# Patient Record
Sex: FEMALE | Race: WHITE | NOT HISPANIC OR LATINO | URBAN - METROPOLITAN AREA
[De-identification: names, ages, dates, MRNs, and addresses within clinical notes are randomized per-mention and may not be internally consistent; named-entity substitution may affect disease eponyms.]

---

## 2017-02-21 ENCOUNTER — OFFICE VISIT (OUTPATIENT)
Dept: PSYCHIATRY | Facility: CLINIC | Age: 52
End: 2017-02-21
Attending: PSYCHIATRY & NEUROLOGY
Payer: MEDICARE

## 2017-02-21 VITALS — WEIGHT: 119.4 LBS | DIASTOLIC BLOOD PRESSURE: 74 MMHG | SYSTOLIC BLOOD PRESSURE: 141 MMHG | HEART RATE: 72 BPM

## 2017-02-21 DIAGNOSIS — F90.0 ATTENTION DEFICIT HYPERACTIVITY DISORDER (ADHD), PREDOMINANTLY INATTENTIVE TYPE: ICD-10-CM

## 2017-02-21 DIAGNOSIS — F41.1 GAD (GENERALIZED ANXIETY DISORDER): Primary | ICD-10-CM

## 2017-02-21 PROCEDURE — 99212 OFFICE O/P EST SF 10 MIN: CPT | Mod: ZF

## 2017-02-21 RX ORDER — TRAZODONE HYDROCHLORIDE 100 MG/1
TABLET ORAL
Qty: 90 TABLET | Refills: 3 | Status: SHIPPED | OUTPATIENT
Start: 2017-02-21 | End: 2017-07-11

## 2017-02-21 RX ORDER — METHYLPHENIDATE HYDROCHLORIDE 54 MG/1
54 TABLET ORAL EVERY MORNING
Qty: 30 TABLET | Refills: 0 | Status: SHIPPED | OUTPATIENT
Start: 2017-02-21 | End: 2017-03-22

## 2017-02-21 RX ORDER — QUETIAPINE FUMARATE 25 MG/1
25 TABLET, FILM COATED ORAL
Qty: 30 TABLET | Refills: 1 | Status: SHIPPED | OUTPATIENT
Start: 2017-02-21 | End: 2017-10-10

## 2017-02-21 RX ORDER — GABAPENTIN 100 MG/1
100 CAPSULE ORAL 3 TIMES DAILY PRN
Qty: 90 CAPSULE | Refills: 1 | Status: SHIPPED | OUTPATIENT
Start: 2017-02-21 | End: 2017-10-10

## 2017-02-21 NOTE — PROGRESS NOTES
"Progress Notes   Amy Ricks MD (Physician)     Psychiatry         IDENTIFYING DATA:  The patient is a 51-year-old  white female, self-referred, who presented 2 months ago to me for return to psychiatric care.  She was a previous patient of mine several years ago and  was last followed in .  At that time she was not able to make it to appointments because of transportation and she was referred back to her primary care physician for followup.      Fatigue and depression have been the most prominent problems as has chronic fibromyalgia pain.         HISTORY OF PRESENT ILLNESS: The patient did not make it to her last appt due to 's illness. She also went to a  and realized she had been away from people and \"inviiible\" for 4 yrs. She tthinks Seroquel and Lamictal helped.  Sleep is pretty good; she is down to 2 Trazodone.  Restless legs not bad and she takes only 1 clonazepam.  Pain keeps her awake.  She doesn't feel anything. It is hard to get motivated.  She just started getting walking and this feels good. She completed 10 weeks PT and has more to go but cancelled appt due to Don's condition. She is decondishioned . She feels exhausted and then feels guilty about it and not being able to play more with the dog.      Concentration is not good but has clear moments where she can retain info.    Panic attacks - when watching news. No unanticipated ones.     Eating always good.  She eats out of boredom.  This scares her because of her h/o ED. She chews sugary gum because it is comforting. No alcohol use.  She has dental problems both related to eating disorder and gum chewing and dry mouth from meds.       .  Her current prescribing physician is Dr. Lucas in [place].  Another concern of hers is her cognitive functioning.  She describes losing her words and having attention problems.         She has been maintained on the following psychiatric medications :    1.  Cymbalta 120 mg daily "  -unclear if this is beneficial  2.  Concerta 54 mg daily  - this is beneficial  3.  Nortriptyline 50 mg q hs for sleep. - definitely helps sleep     4.  BuSpar 40 mg q a.m. and 30 mg q p.m.  -finds this helpful  5.  Klonopin 0.5 mg q hs for sleep.  with the other meds, sleeps ok.      PAST PSYCHIATRIC HISTORY:  History of depression and anxiety are longstanding.  She has been on several medications.   Zoloft which helped.   Paxil also helped.  She was on Effexor which was okay but gave her a dry mouth.  There was some problem from Lexapro.  She is unsure if she was on Celexa.  Probably she was on Prozac.  Trazodone gave her a dry mouth.  She was never on Wellbutrin largely because of her history of seizures 10 years ago.  She was on Lyrica more for pain but it caused swelling and shaking involuntary movements.  Gabapentin caused neck stiffness.  Her recollection is that seroquel and lamictal were helpful for mood.       MEDICAL HISTORY:  The patient has had fibromyalgia for many years and has severely limited her activities and effected her mood.     Her neck always has tension and she has ongoing problems with migraines. She was on oxycodone 20 mg for many years and it did help with pain, anxiety and reflux, but has been off for a few months. . Ite was discontinued because of hyperalgesia.  She is now doing physical therapy.  She has noted significant limitations in her daily function because of being off of opiates.        OTHER MEDICATIONS:  Estradiol, Compazine and Maxalt        FAMILY HISTORY:  Notable for Alzheimer disease in mother.  Father with alcoholism.  He was found dead in his apartment over 5 years ago.        SOCIAL HISTORY:  The patient has been  for 17 years to Jamal who is now retired. He was recently hospitalized and noted to have EF of 40%.   He is on disability.  She enjoys reading.  Her 's dog, an aerdale has been instrumental in getting her out of the house to walk.      REVIEW OF  SYSTEMS:   headaches, pain and fatigue.  Remainder of ten-point review of systems negative except as noted above.        MENTAL STATUS EXAMINATION:  The patient is very slender, appears her stated age. Oriented x3.  Mood is anxious and depressed , affect consistent.   Speech is regular rate and rhythm. Language intact.  She does have some word finding difficulty, mild-mod.  Thought processes are logical and goal directed..  Thought content is negative for suicidality and psychotic symptoms.  Thought associations are clear.  .  Fund of knowledge is good.  Insight and judgment are good.        ASSESSMENT:  This is a 51-year-old  white female has  major depression, BEE, panic disorder, ADHD and fibromyalgia, chronic pain a result.  She is in a very difficult relationship.          DIAGNOSES:    1. Major depressive disorder, recurrent, mod     2. Generalized anxiety disorder.     3. Panic disorder.    4. Attention deficit disorder.    5. Bulimia nervosa in full remission.    6. Fibromyalgia.    7. GERD.        PLAN:  Discussed various options for depression, anxiety management.  A consideration is changing anti-depressant but she believes that Seroquel was helpful for her mood so we agreed to try it.  She will take 25 mg QHS and if she is too tired with the combination, she should decrease nortriptyline to 25 mg QHS.   A prescription was written for Concerta 54 mg. Also, she has a supply of gabapentin 100 mg and I suggested she could take one tid prn anxiety.  She will return again in 1 month.     I spent a total of 45 minutes face-to-face with Rossi Tavarez during today's office visit.  Over 50% of this time was spent counseling the patient and/or coordinating care regarding the complicated regimen and choices of medications.  See note for details.        CHANEL GREEN MD

## 2017-02-21 NOTE — MR AVS SNAPSHOT
After Visit Summary   2017    Rossi Tavarez    MRN: [id number]           Patient Information     Date Of Birth          1965        Visit Information        Provider Department      2017 4:00 PM Amy Ricks MD Psychiatry Clinic        Today's Diagnoses     BEE (generalized anxiety disorder)    -  1    Attention deficit hyperactivity disorder (ADHD), predominantly inattentive type           Follow-ups after your visit        Follow-up notes from your care team     Return in about 4 weeks (around 3/21/2017).      Your next 10 appointments already scheduled     Mar 21, 2017  2:30 PM CDT   Adult Med Follow UP with Amy Ricks MD   Psychiatry Clinic ([facility])    [address] [facility] [address] [phone]              Who to contact     Please call your clinic at [phone] to:    Ask questions about your health    Make or cancel appointments    Discuss your medicines    Learn about your test results    Speak to your doctor   If you have compliments or concerns about an experience at your clinic, or if you wish to file a complaint, please contact [facility] Patient Relations at [phone] or email us at [email or web address]         Additional Information About Your Visit        MyChart Information     [employer] is an electronic gateway that provides easy, online access to your medical records. With [employer], you can request a clinic appointment, read your test results, renew a prescription or communicate with your care team.     To sign up for [employer] visit the website at [email or web address]   You will be asked to enter the access code listed below, as well as some personal information. Please follow the directions to create your username and password.     Your access code is: [email or web address]  Expires: 2017  5:32 PM     Your access code will  in 90 days. If  you need help or a new code, please contact your [facility] or call [phone] for assistance.        Care EveryWhere ID     This is your Care EveryWhere ID. This could be used by other organizations to access your [facility] medical records  [id number]        Your Vitals Were     Pulse                   72            Blood Pressure from Last 3 Encounters:   02/21/17 141/74   12/27/16 130/80   11/22/16 123/79    Weight from Last 3 Encounters:   02/21/17 54.2 kg (119 lb 6.4 oz)   12/27/16 52.8 kg (116 lb 6.4 oz)   11/22/16 53.1 kg (117 lb)              Today, you had the following     No orders found for display         Today's Medication Changes          These changes are accurate as of: 2/21/17  5:32 PM.  If you have any questions, ask your nurse or doctor.               Start taking these medicines.        Dose/Directions    gabapentin 100 MG capsule   Commonly known as:  NEURONTIN   Used for:  BEE (generalized anxiety disorder)   Started by:  Amy Ricks MD        Dose:  100 mg   Take 1 capsule (100 mg) by mouth 3 times daily as needed   Quantity:  90 capsule   Refills:  1       QUEtiapine 25 MG tablet   Commonly known as:  SEROQUEL   Used for:  BEE (generalized anxiety disorder)   Started by:  Amy Ricks MD        Dose:  25 mg   Take 1 tablet (25 mg) by mouth nightly as needed   Quantity:  30 tablet   Refills:  1       traZODone 100 MG tablet   Commonly known as:  DESYREL   Used for:  BEE (generalized anxiety disorder)   Started by:  Amy Ricks MD        2-3 tablets QHS prn sleep   Quantity:  90 tablet   Refills:  3            Where to get your medicines      These medications were sent to [id number] - [place], [place] - [address]  [address] [facility] [address]     Phone:  [phone]     gabapentin 100 MG capsule    QUEtiapine 25 MG tablet    traZODone 100 MG tablet         Some of these will need a paper prescription and others can  be bought over the counter.  Ask your nurse if you have questions.     Bring a paper prescription for each of these medications     methylphenidate ER 54 MG CR tablet                Primary Care Provider Office Phone # Fax #    Fei Lucas [phone]       [facility] [address]  [facility] [address]        Thank you!     Thank you for choosing PSYCHIATRY CLINIC  for your care. Our goal is always to provide you with excellent care. Hearing back from our patients is one way we can continue to improve our services. Please take a few minutes to complete the written survey that you may receive in the mail after your visit with us. Thank you!             Your Updated Medication List - Protect others around you: Learn how to safely use, store and throw away your medicines at www.disposemymeds.org.          This list is accurate as of: 2/21/17  5:32 PM.  Always use your most recent med list.                   Brand Name Dispense Instructions for use    clonazePAM 0.5 MG tablet    klonoPIN    90 tablet    Take 3 tablets (1.5 mg) by mouth nightly as needed for anxiety       gabapentin 100 MG capsule    NEURONTIN    90 capsule    Take 1 capsule (100 mg) by mouth 3 times daily as needed       methylphenidate ER 54 MG CR tablet    CONCERTA    30 tablet    Take 1 tablet (54 mg) by mouth every morning       QUEtiapine 25 MG tablet    SEROQUEL    30 tablet    Take 1 tablet (25 mg) by mouth nightly as needed       traZODone 100 MG tablet    DESYREL    90 tablet    2-3 tablets QHS prn sleep

## 2017-03-22 DIAGNOSIS — F90.0 ATTENTION DEFICIT HYPERACTIVITY DISORDER (ADHD), PREDOMINANTLY INATTENTIVE TYPE: ICD-10-CM

## 2017-03-22 RX ORDER — METHYLPHENIDATE HYDROCHLORIDE 54 MG/1
54 TABLET ORAL EVERY MORNING
Qty: 30 TABLET | Refills: 0 | Status: SHIPPED | OUTPATIENT
Start: 2017-03-22 | End: 2017-07-11

## 2017-03-22 RX ORDER — METHYLPHENIDATE HYDROCHLORIDE 54 MG/1
54 TABLET ORAL EVERY MORNING
Qty: 30 TABLET | Refills: 0 | Status: SHIPPED | OUTPATIENT
Start: 2017-04-22 | End: 2017-04-18

## 2017-03-22 NOTE — TELEPHONE ENCOUNTER
Refill Request  Received: Yesterday       Jennifer Duuqe, Zee JUAREZ RN       Phone Number: [phone]                     Caller:  Patient   Medication:  [facility]   Pharmacy and location:  [facility]   Have you contacted the pharmacy: no   How much of medication do you have left:  Out in a week   Pending appt: 5/2/17   Okay to leave VM:  yes     Patient was scheduled to see Dr. Ricks today, but had to reschedule to May 2.  She normally picks up her scripts when she is here, so was not sure if it is better to mail the script to her or send to the pharmacy.  She would like a call back to discuss her options.

## 2017-03-28 NOTE — TELEPHONE ENCOUNTER
Scripts signed by Dr. Ricks    Called and lvm for pt requesting c/b to verify how she would like to receive scripts (p/u, mail to home, or mail to pharmacy)    Clinic number provided for c/b.

## 2017-04-10 NOTE — TELEPHONE ENCOUNTER
From: Rosa Anton  Sent: 4/7/2017   2:04 PM  To: Zee Romero RN  Subject: Med Refill    Contact: [phone]                                     Юлия/abhijit    Caller:  Pt  Medication:  Concerta  Pharmacy and location:  [facility] [place]  Have you contacted the pharmacy: Yesterday   How much of medication do you have left: None  Pending appt: 5/2  Okay to leave VM: Yes    Pt said she was told med would be faxed to pharmacy but they have not received it.

## 2017-04-10 NOTE — TELEPHONE ENCOUNTER
-Returned call to pt  -Male answers the phone  -Pt unavailable as he believes she is outside  -He is aware of reason for call  -Discuss that scripts were still in clinic  -He requested that scripts mailed to home and he will relay to pt  -Verified address  -Scripts mailed to pt's home address:    [address] [facility] [address]

## 2017-04-18 ENCOUNTER — TELEPHONE (OUTPATIENT)
Dept: PSYCHIATRY | Facility: CLINIC | Age: 52
End: 2017-04-18

## 2017-04-18 DIAGNOSIS — F90.0 ATTENTION DEFICIT HYPERACTIVITY DISORDER (ADHD), PREDOMINANTLY INATTENTIVE TYPE: ICD-10-CM

## 2017-04-18 RX ORDER — METHYLPHENIDATE HYDROCHLORIDE 54 MG/1
54 TABLET ORAL EVERY MORNING
Qty: 30 TABLET | Refills: 0 | Status: SHIPPED | OUTPATIENT
Start: 2017-04-22 | End: 2017-06-09

## 2017-04-18 NOTE — TELEPHONE ENCOUNTER
-Returned call to pt  -Her  opened the envelope with scripts in them with a letter cutter  -This accidentally cut off top of prescriptions  -Pharmacy would not refill them even when they brought in cut off part of script  -Pt requesting replacement script be mailed directly to pharmacy  -Next appt is 5/2/17  -Script printed and placed in provider folder

## 2017-04-18 NOTE — TELEPHONE ENCOUNTER
Meds/Specker  Received: Today       Jessica Rodriguez, Zee JUAREZ, RN       Phone Number: [phone] (Call me)                     The pt is caller. States she received the Concerta scripts in the mail, but because of the way her  cut the envelopes open the pharmacy won't fill the script. She's wondering if this can go out today as she's been out of the meds for a couple of weeks now.  She'd like it mailed to the pharmacy this time.     Pharmacy: [facility] [address] [facility] in [place]

## 2017-04-19 NOTE — TELEPHONE ENCOUNTER
-Script signed by Dr. Ricks    -Mailed to:    [facility]   Attn: Pharmacist   [address] [place] [address]    -Pt notified via phone

## 2017-04-24 ENCOUNTER — TELEPHONE (OUTPATIENT)
Dept: PSYCHIATRY | Facility: CLINIC | Age: 52
End: 2017-04-24

## 2017-04-24 NOTE — TELEPHONE ENCOUNTER
Prior Authorization Retail Medication Request  Medication/Dose: Methylphenidate 54 mg  Diagnosis and ICD code:   New/Renewal/Insurance Change PA:   Previously Tried and Failed Therapies:     Insurance ID (if provided): [id number]  Insurance Phone (if provided): [phone]    Any additional info from fax request:     If you received a fax notification from an outside Pharmacy:  Pharmacy Name:[facility]  Pharmacy #:[phone]  Pharmacy Fax:[phone]

## 2017-04-25 NOTE — TELEPHONE ENCOUNTER
-PA approved  -Approval dates: 03/13/2017-04/24/2018  -PA reference number: [id number]  -Pharmacy and patient notified   -Approval letter sent to scanning, copy   kept in psychiatry until scanning completed/   confirmed. Lauren Abraham LPN

## 2017-05-31 ENCOUNTER — TELEPHONE (OUTPATIENT)
Dept: PSYCHIATRY | Facility: CLINIC | Age: 52
End: 2017-05-31

## 2017-05-31 DIAGNOSIS — F41.1 GAD (GENERALIZED ANXIETY DISORDER): ICD-10-CM

## 2017-05-31 DIAGNOSIS — F90.0 ATTENTION DEFICIT HYPERACTIVITY DISORDER (ADHD), PREDOMINANTLY INATTENTIVE TYPE: ICD-10-CM

## 2017-05-31 NOTE — TELEPHONE ENCOUNTER
----- Message from Radha Mello RN sent at 5/31/2017 11:39 AM CDT -----  Regarding: FW: Meds/Millicent  Contact: [phone]      ----- Message -----     From: Jessica Rodriguez     Sent: 5/31/2017  10:59 AM       To: Zee Romero RN  Subject: Jarviss/Millicent                                     The pt is caller.  She needs refills on Ritalin and Clonazepam and would like the scripts mailed to her pharmacy.  States she has 5 pills left of the Ritalin and is getting low on the Clonazepam.  She has an appt on 8/8 and is on the cancellation list as well.  Ok to [facility].    Pharmacy: [facility]

## 2017-06-09 RX ORDER — CLONAZEPAM 0.5 MG/1
1.5 TABLET ORAL
Qty: 90 TABLET | Refills: 0
Start: 2017-06-09 | End: 2017-07-11

## 2017-06-09 RX ORDER — METHYLPHENIDATE HYDROCHLORIDE 54 MG/1
54 TABLET ORAL EVERY MORNING
Qty: 30 TABLET | Refills: 0 | Status: SHIPPED | OUTPATIENT
Start: 2017-06-09 | End: 2017-07-11

## 2017-06-09 NOTE — TELEPHONE ENCOUNTER
Called in the prescription to the pharmacist, [place], at [facility] pharmacy.

## 2017-06-09 NOTE — TELEPHONE ENCOUNTER
Reordered Klonopin and Concerta.  Will call in Klonopin and printed out a paper copy of Concerta for signature.    Called and left generic VM for patient asking for a return call.    Will route to provider for FYI.

## 2017-06-09 NOTE — TELEPHONE ENCOUNTER
Aym Ricks MD Valena, Victoria, RN        Caller: Unspecified (1 week ago)                     Yes this is fine.

## 2017-06-13 NOTE — TELEPHONE ENCOUNTER
Called patient to find out how she would like the Concerta prescription handled.  She asked that it be mailed to [facility] in [place].

## 2017-07-11 ENCOUNTER — OFFICE VISIT (OUTPATIENT)
Dept: PSYCHIATRY | Facility: CLINIC | Age: 52
End: 2017-07-11
Attending: PSYCHIATRY & NEUROLOGY
Payer: MEDICARE

## 2017-07-11 DIAGNOSIS — F41.1 GAD (GENERALIZED ANXIETY DISORDER): ICD-10-CM

## 2017-07-11 DIAGNOSIS — F90.0 ATTENTION DEFICIT HYPERACTIVITY DISORDER (ADHD), PREDOMINANTLY INATTENTIVE TYPE: ICD-10-CM

## 2017-07-11 RX ORDER — TRAZODONE HYDROCHLORIDE 100 MG/1
TABLET ORAL
Qty: 90 TABLET | Refills: 3 | Status: SHIPPED | OUTPATIENT
Start: 2017-07-11 | End: 2017-10-10

## 2017-07-11 RX ORDER — METHYLPHENIDATE HYDROCHLORIDE 54 MG/1
54 TABLET ORAL EVERY MORNING
Qty: 30 TABLET | Refills: 0 | Status: SHIPPED | OUTPATIENT
Start: 2017-07-11 | End: 2017-11-14

## 2017-07-11 RX ORDER — CLONAZEPAM 0.5 MG/1
1.5 TABLET ORAL
Qty: 90 TABLET | Refills: 1 | Status: SHIPPED | OUTPATIENT
Start: 2017-07-11 | End: 2017-11-14

## 2017-07-11 RX ORDER — METHYLPHENIDATE HYDROCHLORIDE 54 MG/1
54 TABLET ORAL EVERY MORNING
Qty: 30 TABLET | Refills: 0 | Status: SHIPPED | OUTPATIENT
Start: 2017-07-11 | End: 2017-10-10

## 2017-07-11 RX ORDER — DULOXETIN HYDROCHLORIDE 60 MG/1
120 CAPSULE, DELAYED RELEASE ORAL DAILY
Qty: 60 CAPSULE | Refills: 3 | Status: SHIPPED | OUTPATIENT
Start: 2017-07-11 | End: 2017-10-10

## 2017-07-11 NOTE — PROGRESS NOTES
Progress Notes  Encounter Date: 7-  Amy Ricks MD   Psychiatry      []Hide copied text  Progress Notes   Amy Ricks MD (Physician)     Psychiatry           IDENTIFYING DATA:  The patient is a 51-year-old  white female, self-referred, who presented 2 months ago to me for return to psychiatric care.  She was a previous patient of mine several years ago and  was last followed in 2011.  At that time she was not able to make it to appointments because of transportation and she was referred back to her primary care physician for followup.      Fatigue and depression have been the most prominent problems as has chronic fibromyalgia pain.       INTERIM HX:: Pain is still the same;  Muscle spasms, burning unchanged.Lyrica did nothing.   She is very frustrated with this.  Fatigue is a major problem.  Once she is nerve pain, she developed rapid heart rate and this could last days.      She ran out of [employer] and she was off of it x 3 months.  There was a delay in getting her rx.  Then she didn't have the money to pick it up.  Very poor concentration off of it.  She is back on it now for last few weeks.  It helps the fatigue and concentration.  She is doing everything she can for her wellbeing. Depression is bad when her pain and fatigue are worse, she gets anxious, heart pounds. Interested in some areas: news.  Motivation is up and down depending on level of pain and fatigue.  She recognizes that she has to move with her fibromyalgia.  Lyrica caused swelling.    She sleeps if she takes Klonopin-1 and Trazodone 200 mg.    She is not having panic attacks but she gets really anxious either at home or out, especially out. Don makes her anxious; he is very controlling.       Concentration is not good      Eating always good until she is in pain and then she has to force herself.  She focuses on vegetables and fruits. No alcohol use.  She has dental problems both related to eating disorder and gum  chewing and dry mouth from meds.       Her current prescribing physician is Dr. Lucas in [place].  Another concern of hers is her cognitive functioning.  She describes losing her words and having attention problems.  These continue.    She has been maintained on the following psychiatric medications :    1.  Cymbalta 120 mg daily  -it has likely helped  2.  Concerta 54 mg daily  - this is beneficial  3.  Nortriptyline 50 mg q hs for sleep. - definitely helps sleep     4.  BuSpar 40 mg q a.m. and 30 mg q p.m.  -finds this helpful  5.  Klonopin 0.5 mg- 1.5 mg q hs for sleep.  with the other meds, sleeps ok. If restless legs, takes 3.  6. Estradiol  7. Levothyroxine  8. Seroquel 25 mgQHS prn.   9. Maxalt  10. Compazine- nausea  11. gabapentin 100 tid prn       PAST PSYCHIATRIC HISTORY:  REVIEWED PREVIOUSLY. SUMMARIZED HERE History of depression and anxiety are longstanding.  She has been on several medications.   Zoloft which helped.   Paxil also helped.  She was on Effexor which was okay but gave her a dry mouth.  There was some problem from Lexapro.  She is unsure if she was on Celexa.  Probably she was on Prozac.  Trazodone gave her a dry mouth.  She was never on Wellbutrin largely because of her history of seizures 10 years ago.  She was on Lyrica more for pain but it caused swelling and shaking involuntary movements.  Gabapentin caused neck stiffness.  Her recollection is that seroquel and lamictal were helpful for mood.       MEDICAL HISTORY:  SUMMARIZED The patient has had fibromyalgia for many years and has severely limited her activities and effected her mood.     Her neck always has tension and she has ongoing problems with migraines. She was on oxycodone 20 mg for many years and it did help with pain, anxiety and reflux, but has been off for a few months. . Ite was discontinued because of hyperalgesia.  She is now doing physical therapy.  She has noted significant limitations in her daily function  because of being off of opiates.        SOCIAL HISTORY:  The patient has been  for 17 years to Jamal who is now retired. He is around the house all the time and is very critical of her and controlling of all finances.  She has to be sneaky to spend any money. A very positive note is that her daughter who is pregnant at 8 mo has moved back to the cities.  They are very close. She enjoys reading.  Her 's dog, an aerdale has been instrumental in getting her out of the house to walk.      REVIEW OF SYSTEMS:   headaches, pain and fatigue.  Remainder of ten-point review of systems negative except as noted above.        MENTAL STATUS EXAMINATION:  The patient is very slender, appears her stated age. Oriented x3.  Mood is anxious and depressed , affect consistent.   Speech is regular rate and rhythm. Language intact.  She does have some word finding difficulty, mild-mod.  Thought processes are logical and goal directed..  Thought content is negative for suicidality and psychotic symptoms.  Thought associations are clear.  .  Fund of knowledge is good.  Insight and judgment are good.        ASSESSMENT:  This is a 51-year-old  white female has  major depression, BEE, panic disorder, ADHD and fibromyalgia, chronic pain a result.  She is in a very difficult relationship.           DIAGNOSES:    1. Major depressive disorder, recurrent, mod     2. Generalized anxiety disorder.     3. Panic disorder.    4. Attention deficit disorder.    5. Bulimia nervosa in full remission.    6. Fibromyalgia.    7. GERD.        PLAN:  Discussed various options for depression, anxiety management.    Discussed anxiety management.     Seroquel 12.5-25 mg bid-tid prn .   Continue Nortriptyline, gabapentin . Concerta         Last month, there was a consideration of changing anti-depressant but she believed that Seroquel was helpful for her mood so we agreed to try it.  if she is too tired with the combination, she should decrease  nortriptyline to 25 mg QHS.   A prescription was written for Concerta 54 mg. Also, she has a supply of gabapentin 100 mg and I suggested she could take one tid prn anxiety.  She will return again in 1 month.      I spent a total of 30 minutes face-to-face with Rossi Tavarez during today's office visit.  Over 50% of this time was spent counseling the patient and/or coordinating care regarding the complicated regimen and choices of medications.  See note for details.        CHANEL GREEN MD

## 2017-07-11 NOTE — MR AVS SNAPSHOT
After Visit Summary   2017    Rossi Tavarez    MRN: [id number]           Patient Information     Date Of Birth          1965        Visit Information        Provider Department      2017 11:00 AM Amy Ricks MD Psychiatry Clinic        Today's Diagnoses     BEE (generalized anxiety disorder)        Attention deficit hyperactivity disorder (ADHD), predominantly inattentive type           Follow-ups after your visit        Follow-up notes from your care team     Return in about 2 months (around 2017).      Who to contact     Please call your clinic at [phone] to:    Ask questions about your health    Make or cancel appointments    Discuss your medicines    Learn about your test results    Speak to your doctor   If you have compliments or concerns about an experience at your clinic, or if you wish to file a complaint, please contact [facility] Patient Relations at [phone] or email us at [email or web address]         Additional Information About Your Visit        MyChart Information     WiTricityt is an electronic gateway that provides easy, online access to your medical records. With Transmension, you can request a clinic appointment, read your test results, renew a prescription or communicate with your care team.     To sign up for WiTricityt visit the website at [email or web address]   You will be asked to enter the access code listed below, as well as some personal information. Please follow the directions to create your username and password.     Your access code is: [email or web address]  Expires: 10/25/2017 12:10 PM     Your access code will  in 90 days. If you need help or a new code, please contact your [facility] or call [phone] for assistance.        Care EveryWhere ID     This is your Care EveryWhere ID. This could be used by other organizations to access your [facility]  records  [id number]         Blood Pressure from Last 3 Encounters:   02/21/17 141/74   12/27/16 130/80   11/22/16 123/79    Weight from Last 3 Encounters:   02/21/17 54.2 kg (119 lb 6.4 oz)   12/27/16 52.8 kg (116 lb 6.4 oz)   11/22/16 53.1 kg (117 lb)              Today, you had the following     No orders found for display         Today's Medication Changes          These changes are accurate as of: 7/11/17 11:59 PM.  If you have any questions, ask your nurse or doctor.               Start taking these medicines.        Dose/Directions    DULoxetine 60 MG EC capsule   Commonly known as:  CYMBALTA   Used for:  BEE (generalized anxiety disorder)        Dose:  120 mg   Take 2 capsules (120 mg) by mouth daily   Quantity:  60 capsule   Refills:  3            Where to get your medicines      These medications were sent to [id number] - [place], [place] - [address] [facility]  [address] [facility] [address] [address]     Phone:  [phone]     DULoxetine 60 MG EC capsule    traZODone 100 MG tablet         Some of these will need a paper prescription and others can be bought over the counter.  Ask your nurse if you have questions.     Bring a paper prescription for each of these medications     clonazePAM 0.5 MG tablet    methylphenidate ER 54 MG CR tablet    methylphenidate ER 54 MG CR tablet                Primary Care Provider Office Phone # Fax #    Fei Lucas [phone]       [facility] [address]  [facility] [address]        Equal Access to Services     JOSUÉ SADLER AH: Hadii aad ku hadasho Soomaali, waaxda luqadaha, qaybta kaalmada adeegyada, pat menjivar. So [facility] [phone].    ATENCIÓN: Si habla español, tiene a pastor disposición servicios gratuitos de asistencia lingüística. Llame al [phone].    We comply with applicable federal civil rights laws and [place] laws. We do not discriminate on the basis of race, color, national  origin, age, disability sex, sexual orientation or gender identity.            Thank you!     Thank you for choosing PSYCHIATRY CLINIC  for your care. Our goal is always to provide you with excellent care. Hearing back from our patients is one way we can continue to improve our services. Please take a few minutes to complete the written survey that you may receive in the mail after your visit with us. Thank you!             Your Updated Medication List - Protect others around you: Learn how to safely use, store and throw away your medicines at www.disposemymeds.org.          This list is accurate as of: 7/11/17 11:59 PM.  Always use your most recent med list.                   Brand Name Dispense Instructions for use Diagnosis    clonazePAM 0.5 MG tablet    klonoPIN    90 tablet    Take 3 tablets (1.5 mg) by mouth nightly as needed for anxiety    BEE (generalized anxiety disorder)       DULoxetine 60 MG EC capsule    CYMBALTA    60 capsule    Take 2 capsules (120 mg) by mouth daily    BEE (generalized anxiety disorder)       gabapentin 100 MG capsule    NEURONTIN    90 capsule    Take 1 capsule (100 mg) by mouth 3 times daily as needed    BEE (generalized anxiety disorder)       * methylphenidate ER 54 MG CR tablet    CONCERTA    30 tablet    Take 1 tablet (54 mg) by mouth every morning    Attention deficit hyperactivity disorder (ADHD), predominantly inattentive type       * methylphenidate ER 54 MG CR tablet    CONCERTA    30 tablet    Take 1 tablet (54 mg) by mouth every morning    Attention deficit hyperactivity disorder (ADHD), predominantly inattentive type       QUEtiapine 25 MG tablet    SEROQUEL    30 tablet    Take 1 tablet (25 mg) by mouth nightly as needed    BEE (generalized anxiety disorder)       traZODone 100 MG tablet    DESYREL    90 tablet    2-3 tablets QHS prn sleep    BEE (generalized anxiety disorder)       * Notice:  This list has 2 medication(s) that are the same as other medications  prescribed for you. Read the directions carefully, and ask your doctor or other care provider to review them with you.

## 2017-10-10 ENCOUNTER — OFFICE VISIT (OUTPATIENT)
Dept: PSYCHIATRY | Facility: CLINIC | Age: 52
End: 2017-10-10
Attending: PSYCHIATRY & NEUROLOGY
Payer: MEDICARE

## 2017-10-10 DIAGNOSIS — F90.0 ATTENTION DEFICIT HYPERACTIVITY DISORDER (ADHD), PREDOMINANTLY INATTENTIVE TYPE: ICD-10-CM

## 2017-10-10 DIAGNOSIS — F41.1 GAD (GENERALIZED ANXIETY DISORDER): ICD-10-CM

## 2017-10-10 RX ORDER — TRAZODONE HYDROCHLORIDE 100 MG/1
TABLET ORAL
Qty: 90 TABLET | Refills: 3 | Status: SHIPPED | OUTPATIENT
Start: 2017-10-10 | End: 2018-03-13

## 2017-10-10 RX ORDER — GABAPENTIN 300 MG/1
300 CAPSULE ORAL 3 TIMES DAILY PRN
Qty: 90 CAPSULE | Refills: 3 | Status: SHIPPED | OUTPATIENT
Start: 2017-10-10 | End: 2017-11-14

## 2017-10-10 RX ORDER — DULOXETIN HYDROCHLORIDE 60 MG/1
120 CAPSULE, DELAYED RELEASE ORAL DAILY
Qty: 60 CAPSULE | Refills: 3 | Status: SHIPPED | OUTPATIENT
Start: 2017-10-10 | End: 2018-03-13

## 2017-10-10 RX ORDER — METHYLPHENIDATE HYDROCHLORIDE 54 MG/1
54 TABLET ORAL EVERY MORNING
Qty: 30 TABLET | Refills: 0 | Status: SHIPPED | OUTPATIENT
Start: 2017-10-10 | End: 2017-11-14

## 2017-10-10 RX ORDER — QUETIAPINE FUMARATE 25 MG/1
TABLET, FILM COATED ORAL
Qty: 150 TABLET | Refills: 1 | Status: SHIPPED | OUTPATIENT
Start: 2017-10-10 | End: 2017-11-02

## 2017-10-10 NOTE — MR AVS SNAPSHOT
After Visit Summary   10/10/2017    Rossi Tavarez    MRN: [id number]           Patient Information     Date Of Birth          1965        Visit Information        Provider Department      10/10/2017 4:00 PM Amy Ricks MD Psychiatry Clinic        Today's Diagnoses     BEE (generalized anxiety disorder)        Attention deficit hyperactivity disorder (ADHD), predominantly inattentive type           Follow-ups after your visit        Follow-up notes from your care team     Return in about 1 month (around 11/10/2017).      Your next 10 appointments already scheduled     2017  4:30 PM CST   Adult Med Follow UP with Amy Ricks MD   Psychiatry Clinic ([facility])    [address]  [address] [facility] [address] [phone]              Who to contact     Please call your clinic at [phone] to:    Ask questions about your health    Make or cancel appointments    Discuss your medicines    Learn about your test results    Speak to your doctor   If you have compliments or concerns about an experience at your clinic, or if you wish to file a complaint, please contact [facility] Patient Relations at [phone] or email us at [email or web address]         Additional Information About Your Visit        MyChart Information     [email or web address] is an electronic gateway that provides easy, online access to your medical records. With Shopsy, you can request a clinic appointment, read your test results, renew a prescription or communicate with your care team.     To sign up for [email or web address] visit the website at [email or web address]   You will be asked to enter the access code listed below, as well as some personal information. Please follow the directions to create your username and password.     Your access code is: [email or web address]  Expires: 10/25/2017 12:10 PM     Your access code will  in 90 days. If  you need help or a new code, please contact your [facility] or call [phone] for assistance.        Care EveryWhere ID     This is your Care EveryWhere ID. This could be used by other organizations to access your [facility] medical records  [id number]         Blood Pressure from Last 3 Encounters:   02/21/17 141/74   12/27/16 130/80   11/22/16 123/79    Weight from Last 3 Encounters:   02/21/17 54.2 kg (119 lb 6.4 oz)   12/27/16 52.8 kg (116 lb 6.4 oz)   11/22/16 53.1 kg (117 lb)              Today, you had the following     No orders found for display         Today's Medication Changes          These changes are accurate as of: 10/10/17  5:14 PM.  If you have any questions, ask your nurse or doctor.               These medicines have changed or have updated prescriptions.        Dose/Directions    gabapentin 300 MG capsule   Commonly known as:  NEURONTIN   This may have changed:    - medication strength  - how much to take   Used for:  BEE (generalized anxiety disorder)        Dose:  300 mg   Take 1 capsule (300 mg) by mouth 3 times daily as needed   Quantity:  90 capsule   Refills:  3       QUEtiapine 25 MG tablet   Commonly known as:  SEROQUEL   This may have changed:    - how much to take  - how to take this  - when to take this  - reasons to take this  - additional instructions   Used for:  BEE (generalized anxiety disorder)        One tid and 2 at HS prn   Quantity:  150 tablet   Refills:  1            Where to get your medicines      These medications were sent to [id number] [place], [place] - [address]  [address] [facility] [address]     Phone:  [phone]     DULoxetine 60 MG EC capsule    gabapentin 300 MG capsule    QUEtiapine 25 MG tablet    traZODone 100 MG tablet         Some of these will need a paper prescription and others can be bought over the counter.  Ask your nurse if you have questions.     Bring a paper prescription for each of  these medications     methylphenidate ER 54 MG CR tablet                Primary Care Provider Office Phone # Fax #    Fei Lucas [phone]       [facility] [address]  [facility] [address]        Equal Access to Services     JOSUÉ SADLER : Hadvenessa amita corbett jerio Sogabriellaali, waaxda luqadaha, qaybta kaalmada adeegyada, pat mayon adrienne paredes laAnaberanbe menjivar. So [facility] [phone].    ATENCIÓN: Si habla español, tiene a pastor disposición servicios gratuitos de asistencia lingüística. Llame al [phone].    We comply with applicable federal civil rights laws and [place] laws. We do not discriminate on the basis of race, color, national origin, age, disability, sex, sexual orientation, or gender identity.            Thank you!     Thank you for choosing PSYCHIATRY CLINIC  for your care. Our goal is always to provide you with excellent care. Hearing back from our patients is one way we can continue to improve our services. Please take a few minutes to complete the written survey that you may receive in the mail after your visit with us. Thank you!             Your Updated Medication List - Protect others around you: Learn how to safely use, store and throw away your medicines at www.disposemymeds.org.          This list is accurate as of: 10/10/17  5:14 PM.  Always use your most recent med list.                   Brand Name Dispense Instructions for use Diagnosis    clonazePAM 0.5 MG tablet    klonoPIN    90 tablet    Take 3 tablets (1.5 mg) by mouth nightly as needed for anxiety    BEE (generalized anxiety disorder)       DULoxetine 60 MG EC capsule    CYMBALTA    60 capsule    Take 2 capsules (120 mg) by mouth daily    BEE (generalized anxiety disorder)       gabapentin 300 MG capsule    NEURONTIN    90 capsule    Take 1 capsule (300 mg) by mouth 3 times daily as needed    BEE (generalized anxiety disorder)       * methylphenidate ER 54 MG CR tablet    CONCERTA    30 tablet     Take 1 tablet (54 mg) by mouth every morning    Attention deficit hyperactivity disorder (ADHD), predominantly inattentive type       * methylphenidate ER 54 MG CR tablet    CONCERTA    30 tablet    Take 1 tablet (54 mg) by mouth every morning    Attention deficit hyperactivity disorder (ADHD), predominantly inattentive type       QUEtiapine 25 MG tablet    SEROQUEL    150 tablet    One tid and 2 at HS prn    BEE (generalized anxiety disorder)       traZODone 100 MG tablet    DESYREL    90 tablet    2-3 tablets QHS prn sleep    BEE (generalized anxiety disorder)       * Notice:  This list has 2 medication(s) that are the same as other medications prescribed for you. Read the directions carefully, and ask your doctor or other care provider to review them with you.

## 2017-10-10 NOTE — PROGRESS NOTES
"Progress Notes  Psychiatry      []Hide copied text  Progress Notes   Amy Ricks MD (Physician)     Psychiatry            IDENTIFYING DATA:  The patient is a 51-year-old  white female, self-referred, who has returned to me for  psychiatric care.  She was a previous patient of mine several years ago and  was last followed in 2011.  At that time she was not able to make it to appointments because of transportation and she was referred back to her primary care physician for followup.      Fatigue and depression have been the most prominent problems as has chronic fibromyalgia pain.       INTERIM HX::The patient reports a major episode of anxiety and darkness which lasted 2 hours. She took one clonazepam and it resolved. Depression lightened with Seroquel but she has been out of it for over a month (unclear what the problem was). She feels really gloomy. She feels a dread, a darkness.  Energy is low. She really enjoys her granddaughter.  Sometimes she has trouble sleeping, depending on how much pain. Concentration is helped by Concerta.  She has had problems with refills and was out of Concerta and Seroquel for a month.  She does not have any Seroquel currently.    The pain is worse ; her knees buckle; the ligaments and tendons around the knee are causing problems and she received injection in hip because her MD thought it was hip. Pain is multifocal: fibromyalgia, back. She also describes a \"fibro fog.\"     When she develops the intense anxiety heart races. The rapid heart rate also occurs when she is exhausted.      She sleeps if she takes Klonopin-1 and Trazodone 200 mg.       Her current prescribing physician is Dr. Lucas in [place].  Another ongoing concern of hers is her cognitive functioning.  She describes losing her words and having attention problems.      She has been maintained on the following psychiatric medications :    1.  Cymbalta 120 mg daily  -it has likely helped  2.  Concerta 54 mg " daily  - this is beneficial- she was off for a month due to difficulty getting it. It helps with focus, mind racing. She is back on it.   3.  Nortriptyline 50 mg q hs for sleep. - definitely helps sleep     4.  BuSpar 40 mg q a.m. and 30 mg q p.m.  -finds this helpful  5.  Klonopin 0.5 mg- 1.5 mg q hs for sleep.  with the other meds, sleeps ok. If restless legs, takes 3.  Usually 1/night and occasional for anxiety.  6. Estradiol  7. Levothyroxine  8. Seroquel 25 mgQHS prn.- it was starting to help  Mood and then she ran out and couldn't get it refilled..    9. Maxalt- migraines 2-3/week and then a few weeks without any  10. Compazine- nausea  11. gabapentin 100 tid prn       PAST PSYCHIATRIC HISTORY:  REVIEWED PREVIOUSLY. SUMMARIZED HERE History of depression and anxiety are longstanding.  She has been on several medications.   Zoloft which helped.   Paxil also helped.  She was on Effexor which was okay but gave her a dry mouth.  There was some problem from Lexapro.  She is unsure if she was on Celexa.  Probably she was on Prozac.  Trazodone gave her a dry mouth.  She was never on Wellbutrin largely because of her history of seizures 10 years ago.  She was on Lyrica more for pain but it caused swelling and shaking involuntary movements.  Gabapentin caused neck stiffness.  Her recollection is that seroquel and lamictal were helpful for mood.       MEDICAL HISTORY:  SUMMARIZED The patient has had fibromyalgia for many years and has severely limited her activities and effected her mood.     Her neck always has tension and she has ongoing problems with migraines. She was on oxycodone 20 mg for many years and it did help with pain, anxiety and reflux, but has been off for a few months. . Ite was discontinued because of hyperalgesia.  She is now doing physical therapy.  She has noted significant limitations in her daily function because of being off of opiates.        SOCIAL HISTORY:  The patient has been  for 17  years to Jamal who is now retired.He recently was hospitalized for CHF.  She has remained in this relationship because of finances and obligation. A very positive note is that her daughter had her baby and January is now 2 mo old.  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  This is the high point in her life and she is in a good mood when she is there with the baby.  She reports that she has never been closer to her daughter Alecia than now.       REVIEW OF SYSTEMS:   headaches, pain and fatigue.  Remainder of ten-point review of systems negative except as noted above.        MENTAL STATUS EXAMINATION:  The patient is very slender, appears her stated age. Oriented x3.  Mood is anxious and depressed , affect consistent.   Speech is regular rate and rhythm. Language intact.  She does have some word finding difficulty, mild-mod.  Thought processes are logical and goal directed..  Thought content is negative for suicidality and psychotic symptoms.  Thought associations are clear.  .  Fund of knowledge is good.  Insight and judgment are good.        ASSESSMENT:  This is a 51-year-old  white female has  major depression, BEE, panic disorder, ADHD and fibromyalgia, chronic pain a result.  She is in a very difficult relationship.           DIAGNOSES:    1. Major depressive disorder, recurrent, mod     2. Generalized anxiety disorder.     3. Panic disorder.    4. Attention deficit disorder.    5. Bulimia nervosa in full remission.    6. Fibromyalgia.    7. GERD.        PLAN:  Discussed various options for depression, anxiety management.     --Increase Seroquel to 25 mg tid and 50 mg QHS prn .   --Increase gabapentin to 300 mg tid prn  --Continue Nortriptyline,. Concerta, clonazepam at current doses   --RTC 1 mo.            I spent a total of 30 minutes face-to-face with Rossi Tavarez during today's office visit.  Over 50% of this time was spent counseling the patient and/or coordinating care regarding the  complicated regimen and choices of medications.  See note for details.        CHANEL GREEN MD

## 2017-10-25 ENCOUNTER — TELEPHONE (OUTPATIENT)
Dept: PSYCHIATRY | Facility: CLINIC | Age: 52
End: 2017-10-25

## 2017-10-25 DIAGNOSIS — F41.1 GAD (GENERALIZED ANXIETY DISORDER): ICD-10-CM

## 2017-10-25 NOTE — TELEPHONE ENCOUNTER
Rx sent to pharmacy for:   Seroquel 25 mg tabs;  Take 1 tab TID and 2 tabs QHS PRN  #150, 1 RF    Rec'd  fax from pharmacy:   -Insurance will not cover more than 3 tabs/day    Call placed to pt:   -No answer at home number  -VM left requesting c/b to discuss coverage of a medication  -When pt calls back will inquire about willingness to change to 50 mg tab as this likely would be covered by insurance

## 2017-11-02 RX ORDER — QUETIAPINE FUMARATE 50 MG/1
TABLET, FILM COATED ORAL
Qty: 75 TABLET | Refills: 0 | Status: SHIPPED | OUTPATIENT
Start: 2017-11-02 | End: 2017-11-14

## 2017-11-02 NOTE — TELEPHONE ENCOUNTER
Received a refill authorization request from [facility] in [place] regarding quetiapine fumarate 25 mg tablets.    Rx is 25 mg Q TID and 50 mg Q HS PRN.  Insurance requires prior authorization for 5 tablets/day.  Spoke with patient and clarified the dosing.  She reported that she has not taken the medication in a couple of months because she has not been able to get it filled due to insurance reasons.  She stated that it was helpful with her depression, and she has been feeling depressed again since running out.  Discussed whether she is okay with trying to see if insurance will cover 50 mg tablets, which she agreed to.      Sent in Rx for 50 mg tabs, with instructions to take half a tablet (25 mg) PO Q TID and 1 tablet (50 mg) PO Q HS PRN.   Sent electronically for 30 d/s with 0 refills.  Note to pharmacy that this Rx replaces any previous orders sent for this medication.  Explained the dosing to patient and had her teach back to assess understanding.    Follow up appointment: 11/14    Will route to Dr. Ricks for MARGUERITEI.

## 2017-11-14 ENCOUNTER — OFFICE VISIT (OUTPATIENT)
Dept: PSYCHIATRY | Facility: CLINIC | Age: 52
End: 2017-11-14
Attending: PSYCHIATRY & NEUROLOGY
Payer: MEDICARE

## 2017-11-14 DIAGNOSIS — F90.0 ATTENTION DEFICIT HYPERACTIVITY DISORDER (ADHD), PREDOMINANTLY INATTENTIVE TYPE: ICD-10-CM

## 2017-11-14 DIAGNOSIS — F41.1 GAD (GENERALIZED ANXIETY DISORDER): ICD-10-CM

## 2017-11-14 RX ORDER — GABAPENTIN 300 MG/1
CAPSULE ORAL
Qty: 150 CAPSULE | Refills: 3 | Status: SHIPPED | OUTPATIENT
Start: 2017-11-14 | End: 2018-03-13

## 2017-11-14 RX ORDER — METHYLPHENIDATE HYDROCHLORIDE 54 MG/1
54 TABLET ORAL EVERY MORNING
Qty: 30 TABLET | Refills: 0 | Status: SHIPPED | OUTPATIENT
Start: 2017-11-14 | End: 2018-03-02

## 2017-11-14 RX ORDER — CLONAZEPAM 0.5 MG/1
1.5 TABLET ORAL
Qty: 90 TABLET | Refills: 1 | Status: SHIPPED | OUTPATIENT
Start: 2017-11-14 | End: 2018-03-13

## 2017-11-14 RX ORDER — QUETIAPINE FUMARATE 50 MG/1
TABLET, FILM COATED ORAL
Qty: 75 TABLET | Refills: 1 | Status: SHIPPED | OUTPATIENT
Start: 2017-11-14 | End: 2018-03-13

## 2017-11-14 NOTE — MR AVS SNAPSHOT
After Visit Summary   2017    Rossi Tavarez    MRN: [id number]           Patient Information     Date Of Birth          1965        Visit Information        Provider Department      2017 4:30 PM Amy Ricks MD Psychiatry Clinic        Today's Diagnoses     BEE (generalized anxiety disorder)        Attention deficit hyperactivity disorder (ADHD), predominantly inattentive type           Follow-ups after your visit        Follow-up notes from your care team     Return in about 2 months (around 2018).      Your next 10 appointments already scheduled     2018  1:30 PM CST   Adult Med Follow UP with Amy Ricks MD   Psychiatry Clinic ([facility])    [address]  [address] [facility] [address] [phone]              Who to contact     Please call your clinic at [phone] to:    Ask questions about your health    Make or cancel appointments    Discuss your medicines    Learn about your test results    Speak to your doctor   If you have compliments or concerns about an experience at your clinic, or if you wish to file a complaint, please contact [facility] Patient Relations at [phone] or email us at [email or web address]         Additional Information About Your Visit        MyChart Information     Apajat is an electronic gateway that provides easy, online access to your medical records. With [employer], you can request a clinic appointment, read your test results, renew a prescription or communicate with your care team.     To sign up for Apajat visit the website at [email or web address]   You will be asked to enter the access code listed below, as well as some personal information. Please follow the directions to create your username and password.     Your access code is: [email or web address]  Expires: 2018  5:30 PM     Your access code will  in 90 days. If  you need help or a new code, please contact your [facility] or call [phone] for assistance.        Care EveryWhere ID     This is your Care EveryWhere ID. This could be used by other organizations to access your [facility] medical records  [id number]         Blood Pressure from Last 3 Encounters:   02/21/17 141/74   12/27/16 130/80   11/22/16 123/79    Weight from Last 3 Encounters:   02/21/17 54.2 kg (119 lb 6.4 oz)   12/27/16 52.8 kg (116 lb 6.4 oz)   11/22/16 53.1 kg (117 lb)              Today, you had the following     No orders found for display         Today's Medication Changes          These changes are accurate as of: 11/14/17  5:30 PM.  If you have any questions, ask your nurse or doctor.               These medicines have changed or have updated prescriptions.        Dose/Directions    gabapentin 300 MG capsule   Commonly known as:  NEURONTIN   This may have changed:    - how much to take  - how to take this  - when to take this  - reasons to take this  - additional instructions   Used for:  BEE (generalized anxiety disorder)        Take 1 in am, 2 in afternoon and 2 at night   Quantity:  150 capsule   Refills:  3            Where to get your medicines      These medications were sent to [id number] [place], [place] - [address]  [address] [place] [id number]     Phone:  [phone]     gabapentin 300 MG capsule    QUEtiapine 50 MG tablet         Some of these will need a paper prescription and others can be bought over the counter.  Ask your nurse if you have questions.     Bring a paper prescription for each of these medications     clonazePAM 0.5 MG tablet    methylphenidate ER 54 MG CR tablet    methylphenidate ER 54 MG CR tablet                Primary Care Provider Office Phone # Fax #    Fei Lucas [phone]       [employer] [address] [facility] [place] [address]        Equal Access to Services      [facility]: Hadii aad ku kylah Pickering, waaxda luqadaha, qaybta kaalmada ademaggie, pat ivonne gaelseferino learymonabarry rosas . So [facility] [phone].    ATENCIÓN: Si habla español, tiene a pastor disposición servicios gratuitos de asistencia lingüística. Llame al [phone].    We comply with applicable federal civil rights laws and [place] laws. We do not discriminate on the basis of race, color, national origin, age, disability, sex, sexual orientation, or gender identity.            Thank you!     Thank you for choosing PSYCHIATRY CLINIC  for your care. Our goal is always to provide you with excellent care. Hearing back from our patients is one way we can continue to improve our services. Please take a few minutes to complete the written survey that you may receive in the mail after your visit with us. Thank you!             Your Updated Medication List - Protect others around you: Learn how to safely use, store and throw away your medicines at www.disposemymeds.org.          This list is accurate as of: 11/14/17  5:30 PM.  Always use your most recent med list.                   Brand Name Dispense Instructions for use Diagnosis    clonazePAM 0.5 MG tablet    klonoPIN    90 tablet    Take 3 tablets (1.5 mg) by mouth nightly as needed for anxiety    BEE (generalized anxiety disorder)       DULoxetine 60 MG EC capsule    CYMBALTA    60 capsule    Take 2 capsules (120 mg) by mouth daily    BEE (generalized anxiety disorder)       gabapentin 300 MG capsule    NEURONTIN    150 capsule    Take 1 in am, 2 in afternoon and 2 at night    BEE (generalized anxiety disorder)       * methylphenidate ER 54 MG CR tablet    CONCERTA    30 tablet    Take 1 tablet (54 mg) by mouth every morning    Attention deficit hyperactivity disorder (ADHD), predominantly inattentive type       * methylphenidate ER 54 MG CR tablet    CONCERTA    30 tablet    Take 1 tablet (54 mg) by mouth every morning    Attention deficit hyperactivity  disorder (ADHD), predominantly inattentive type       QUEtiapine 50 MG tablet    SEROquel    75 tablet    Take half a tablet (25 mg) PO Q TID and 1 tablet (50 mg) Q HS PRN    BEE (generalized anxiety disorder)       traZODone 100 MG tablet    DESYREL    90 tablet    2-3 tablets QHS prn sleep    BEE (generalized anxiety disorder)       * Notice:  This list has 2 medication(s) that are the same as other medications prescribed for you. Read the directions carefully, and ask your doctor or other care provider to review them with you.

## 2017-11-14 NOTE — PROGRESS NOTES
Progress Notes  Psychiatry      []Hide copied text  Progress Notes   Amy Ricks MD (Physician)     Psychiatry            IDENTIFYING DATA:  The patient is a 52-year-old  white female, self-referred, who has returned to me for  psychiatric care.  She was a previous patient of mine several years ago and  was last followed in 2011.  At that time she was not able to make it to appointments because of transportation and she was referred back to her primary care physician for followup.      Fatigue and depression have been the most prominent problems as has chronic fibromyalgia pain.       INTERIM HX::The patient gets blue for no reason , she is not interested in things.  She really enjoys her granddaughter though and takes care of her 2-3x/week..  Energy pretty low, comes and goes, somewhat dependent on level of pain.  Anxiety level is always high, gotten worse, more pancky.  She is fearful of the future, afraid of breaking her hip.  She tries to wall her  off.       is very needy; he won't care for himself and has renal , cardiac, diabetes problems.  They have been together 20 yrs.     She had problems getting the seroquel. Insurance would not cover more than 3 tablets/day but she now has it.  She has not started it yet.     Anxiety and mood have been helped by seroquel in the past. She is not sure if cymbalta helps and when she ran out she had very unpleasant effects; she is back on it now. Concerta definietly helps focus and concentration.       Sleeps is a problem - she takes Klonapin and Trazodone.  Sleep depends on amount of pain.      Her current prescribing physician is Dr. Lucas in [place].  Another ongoing concern of hers is her cognitive functioning.  She describes losing her words and having attention problems.       She has been maintained on the following psychiatric medications :    1.  Cymbalta 120 mg daily  -it has likely helped  2.  Concerta 54 mg daily  - this is  beneficial- she was off for a month due to difficulty getting it. It helps with focus, mind racing. She is back on it.    4.  BuSpar 40 mg q a.m. and 30 mg q p.m.  -finds this helpful  5.  Klonopin 0.5 mg- 1.5 mg q hs for sleep.  with the other meds, sleeps ok. If restless legs, takes 3.  Usually 1/night and occasional for anxiety.  6. Estradiol  7. Levothyroxine  8. Seroquel 25 mg tid and 50 QHS.  Have to use 50 mg tabs per insurance   9. Maxalt- migraines 2-3/week and then a few weeks without any  10. Compazine- nausea  11. gabapentin 300 tid prn - increased at last visit      PAST PSYCHIATRIC HISTORY:  REVIEWED PREVIOUSLY. SUMMARIZED HERE History of depression and anxiety are longstanding.  She has been on several medications.   Zoloft which helped.   Paxil also helped.  She was on Effexor which was okay but gave her a dry mouth.  There was some problem from Lexapro.  She is unsure if she was on Celexa.  Probably she was on Prozac.  Trazodone gave her a dry mouth.  She was never on Wellbutrin largely because of her history of seizures 10 years ago.  She was on Lyrica more for pain but it caused swelling and shaking involuntary movements.  Gabapentin caused neck stiffness.  Her recollection is that seroquel and lamictal were helpful for mood.       MEDICAL HISTORY:  SUMMARIZED The patient has had fibromyalgia for many years and has severely limited her activities and effected her mood.     Her neck always has tension and she has ongoing problems with migraines. She was on oxycodone 20 mg for many years and it did help with pain, anxiety and reflux, but has been off for a few months. . Ite was discontinued because of hyperalgesia.  She is now doing physical therapy.  She has noted significant limitations in her daily function because of being off of opiates.        SOCIAL HISTORY:  The patient has been  for 20 years to Jamal who is now retired.  She has remained in this relationship because of finances and  obligation. A very positive note is that her daughter had her baby and January is now 2 mo old.  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  This is the high point in her life and she is in a good mood when she is there with the baby.  She reports that she has never been closer to her daughter Alecia than now.  She had a baby shower for her.        REVIEW OF SYSTEMS:   headaches, pain and fatigue.  Remainder of ten-point review of systems negative except as noted above.        MENTAL STATUS EXAMINATION:  The patient is very slender, appears her stated age. Oriented x3.  Mood is anxious  , affect consistent.   Speech is regular rate and rhythm. Language intact.  She does have some word finding difficulty, mild-mod.  Thought processes are logical and goal directed..  Thought content is negative for suicidality and psychotic symptoms.  Thought associations are clear.  .  Fund of knowledge is good.  Insight and judgment are good.        ASSESSMENT:  This is a 51-year-old  white female has  major depression, BEE, panic disorder, ADHD and fibromyalgia, chronic pain a result.  She is in a very difficult relationship.           DIAGNOSES:    1. Major depressive disorder, recurrent, mod     2. Generalized anxiety disorder.     3. Panic disorder.    4. Attention deficit disorder.    5. Bulimia nervosa in full remission.    6. Fibromyalgia.    7. GERD.        PLAN:  Discussed various options for depression, anxiety management.     -- Seroquel  25 mg tid and 50 mg QHS prn .   --Increase gabapentin to 300-600-600 mg tid OR 300am -1200hs if it is too sedating during daytime.  Plan to increase dose to help with anxiety and pain.   --Continue others  --RTC 2 mo.            I spent a total of 30 minutes face-to-face with Rossi Tavarez during today's office visit.  Over 50% of this time was spent counseling the patient and/or coordinating care regarding the complicated regimen and choices of medications.  See note for  details.        CHANEL GREEN MD

## 2018-03-02 DIAGNOSIS — F90.0 ATTENTION DEFICIT HYPERACTIVITY DISORDER (ADHD), PREDOMINANTLY INATTENTIVE TYPE: ICD-10-CM

## 2018-03-02 RX ORDER — METHYLPHENIDATE HYDROCHLORIDE 54 MG/1
54 TABLET ORAL EVERY MORNING
Qty: 30 TABLET | Refills: 0 | Status: SHIPPED | OUTPATIENT
Start: 2018-03-02 | End: 2018-03-13

## 2018-03-02 NOTE — TELEPHONE ENCOUNTER
"Appt history:  Last seen:  11/14/17  RTC:  2 months  Cancel:  1/16/18 rescheduled  No-show:  2/20/18  Next appt:  3/13/18     Incoming refill request from:  Pt via phone call     Medication(s) requested:    Concerta 54 mg tab, #30    Last RF per med tab:    11/14/17 with note \"Do not fill until December 10, 2017\"    Last RF per [place] :  1/8/18    Hard copy printed and in Dr. Ricks's folder to sign when in clinic next week.    "

## 2018-03-02 NOTE — TELEPHONE ENCOUNTER
Radha Mello, Radha Ulloa, RN       Phone Number: [phone]                       Previous Messages       ----- Message -----      From: Jennifer Driscoll      Sent: 3/1/2018   1:16 PM        To: Zee Romero RN   Subject: refill/Specker                                   Caller:  Rossi   Relationship to pt: self   Medication:   concerta   How many days left of med do you have left (if >3 day supply, redirect to pharmacy): out   Pharmacy and location: [facility] in [place]   Pending appt date:  3/12   Okay to leave detailed VM:  yes

## 2018-03-13 ENCOUNTER — OFFICE VISIT (OUTPATIENT)
Dept: PSYCHIATRY | Facility: CLINIC | Age: 53
End: 2018-03-13
Attending: PSYCHIATRY & NEUROLOGY
Payer: MEDICARE

## 2018-03-13 DIAGNOSIS — F41.1 GAD (GENERALIZED ANXIETY DISORDER): ICD-10-CM

## 2018-03-13 DIAGNOSIS — F90.0 ATTENTION DEFICIT HYPERACTIVITY DISORDER (ADHD), PREDOMINANTLY INATTENTIVE TYPE: ICD-10-CM

## 2018-03-13 RX ORDER — CLONAZEPAM 0.5 MG/1
1.5 TABLET ORAL
Qty: 90 TABLET | Refills: 2 | Status: SHIPPED | OUTPATIENT
Start: 2018-03-13 | End: 2018-10-16

## 2018-03-13 RX ORDER — METHYLPHENIDATE HYDROCHLORIDE 54 MG/1
54 TABLET ORAL EVERY MORNING
Qty: 30 TABLET | Refills: 0 | Status: SHIPPED | OUTPATIENT
Start: 2018-03-13 | End: 2019-11-12

## 2018-03-13 RX ORDER — TRAZODONE HYDROCHLORIDE 100 MG/1
TABLET ORAL
Qty: 90 TABLET | Refills: 3 | Status: SHIPPED | OUTPATIENT
Start: 2018-03-13 | End: 2018-10-16

## 2018-03-13 RX ORDER — GABAPENTIN 300 MG/1
600 CAPSULE ORAL 3 TIMES DAILY
Qty: 540 CAPSULE | Refills: 1 | Status: SHIPPED | OUTPATIENT
Start: 2018-03-13 | End: 2018-10-16

## 2018-03-13 RX ORDER — METHYLPHENIDATE HYDROCHLORIDE 10 MG/1
10 TABLET ORAL 2 TIMES DAILY
Qty: 60 TABLET | Refills: 0 | Status: SHIPPED | OUTPATIENT
Start: 2018-03-13 | End: 2018-07-24

## 2018-03-13 RX ORDER — QUETIAPINE FUMARATE 100 MG/1
100 TABLET, FILM COATED ORAL
Qty: 90 TABLET | Refills: 1 | Status: SHIPPED | OUTPATIENT
Start: 2018-03-13 | End: 2018-08-21

## 2018-03-13 RX ORDER — DULOXETIN HYDROCHLORIDE 60 MG/1
120 CAPSULE, DELAYED RELEASE ORAL DAILY
Qty: 180 CAPSULE | Refills: 1 | Status: SHIPPED | OUTPATIENT
Start: 2018-03-13 | End: 2018-08-21

## 2018-03-13 RX ORDER — METHYLPHENIDATE HYDROCHLORIDE 10 MG/1
10 TABLET ORAL 2 TIMES DAILY
Qty: 60 TABLET | Refills: 0 | Status: SHIPPED | OUTPATIENT
Start: 2018-03-13 | End: 2018-03-13

## 2018-03-13 NOTE — PROGRESS NOTES
Progress Notes  Psychiatry      []Hide copied text  Progress Notes   Amy Ricks MD (Physician)     Psychiatry    Patient was last seen 2017          IDENTIFYING DATA:  The patient is a 52-year-old  white female, self-referred, who has returned to me for  psychiatric care.  She was a previous patient of mine several years ago and  was last followed in .  At that time she was not able to make it to appointments because of transportation and she was referred back to her primary care physician for followup.       CC:Fatigue and depression have been the most prominent problems as has chronic fibromyalgia pain.       INTERIM HX::Since her last visit, her mom  . She was on hospice, and had Alzheimers. She quit eating and drinking.  Also, her  is gravely ill; he has gastriccancer into lymph nodes as well as CHF. He is on chemo and will have surgery then chemo again.  He has become a very mean person. She reports that she has never been so depressed. It is hard to get out of bed, brush teeth.  Panic attacks started, high level of anxiety. Last week she used Klonopin in daytime.  Energy is low probably related to stress and anxiety. Sleep is fair, she lies awake. she takes Klonapin and Trazodone.  Sleep depends on amount of pain.         The patient is out of Concerta,  It has helped concentration and focus.  She has missed follow-up appt.    She had problems getting the seroquel. She has the Seroquel now. Anxiety and mood were helped by it in the past.     Anxiety and mood have been helped by seroquel in the past.. Concerta definietly helps focus and concentration.         Also, she has described losing her words and having attention problems but we did not further explore this at this visit. .        She has been maintained on the following psychiatric medications :    1.  Cymbalta 120 mg daily  -it has likely helped  2.  Concerta 54 mg daily  - this is beneficial- she was off for a month  due to difficulty getting it. It helps with focus, mind racing. She is back on it.    4.  BuSpar 40 mg q a.m. and 30 mg q p.m.  -finds this helpful  5.  Klonopin 0.5 mg- 1.5 mg q hs for sleep.  with the other meds, sleeps ok. If restless legs, takes 3.  Usually 1/night and occasional for anxiety.  6. Estradiol  7. Levothyroxine  8. Seroquel 100 mg at nite.     9. Maxalt- migraines 2-3/week and then a few weeks without any. This occurs with IBS.    10. Compazine- nausea  11. gabapentin 600 bid prn - increased at last visit  12. Trazodone 200 QHS      PAST PSYCHIATRIC HISTORY:  REVIEWED PREVIOUSLY. SUMMARIZED HERE History of depression and anxiety are longstanding.  She has been on several medications.   Zoloft which helped.   Paxil also helped.  She was on Effexor which was okay but gave her a dry mouth.  There was some problem from Lexapro.  She is unsure if she was on Celexa.  Probably she was on Prozac.  Trazodone gave her a dry mouth.  She was never on Wellbutrin largely because of her history of seizures 10 years ago.  She was on Lyrica more for pain but it caused swelling and shaking involuntary movements.  Gabapentin caused neck stiffness.  Her recollection is that seroquel and lamictal were helpful for mood.       MEDICAL HISTORY:  SUMMARIZED The patient has had fibromyalgia for many years and has severely limited her activities and effected her mood.     Her neck always has tension and she has ongoing problems with migraines. She was on oxycodone 20 mg for many years and it did help with pain, anxiety and reflux, but has been off for a few months. . Ite was discontinued because of hyperalgesia.  She is now doing physical therapy.  She has noted significant limitations in her daily function because of being off of opiates.        SOCIAL HISTORY:  The patient has been  for 20 years to Jamal who is now retired.  He has a serious illness as described above. Uncertain course. She has remained in this  relationship because of finances and obligation. A very positive note is that her daughter had her baby and January is the zoran of her life.   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  This is the high point in her life and she is in a good mood when she is there with the baby.  She reports that she has never been closer to her daughter Alecia than now.       REVIEW OF SYSTEMS:   headaches, pain and fatigue.  Remainder of ten-point review of systems negative except as noted above.        MENTAL STATUS EXAMINATION:  The patient is very slender, appears her stated age. Oriented x3.  Mood is anxious  , affect consistent.   Speech is regular rate and rhythm. Language intact.  She does have some word finding difficulty, mild-mod.  Thought processes are logical and goal directed..  Thought content is negative for suicidality and psychotic symptoms.  Thought associations are clear.  .  Fund of knowledge is good.  Insight and judgment are good.        ASSESSMENT:  This is a 51-year-old  white female has  major depression, BEE, panic disorder, ADHD and fibromyalgia, chronic pain a result.  She is in a very difficult relationship and has high level of anxiety and depression.          DIAGNOSES:    1. Major depressive disorder, recurrent, mod     2. Generalized anxiety disorder.     3. Panic disorder.    4. Attention deficit disorder.    5. Bulimia nervosa in full remission.    6. Fibromyalgia.    7. GERD.        PLAN: Continue current medications   --Seroquel 100 mg QHS  --Increase gabapentin to 600 tid  --Substitute methyphenidate 10 mg bid for concerta due to cost  --RTC 2 mo.            I spent a total of 30 minutes face-to-face with Rossi Tavarez during today's office visit.  Over 50% of this time was spent counseling the patient and/or coordinating care regarding the complicated regimen and choices of medications.  See note for details.        CHANEL GREEN MD     Psychiatry Clinic Individual  Psychotherapy Note                                                                     [16]   Start time - N/A        End time - N/A  Date last reviewed - N/A       Date next due - N/A    Subjective: This supportive psychotherapy session addressed issues related to current stressors consisting of  and his illness, pain condition, finances.  Patient's reaction: Preparatory in the context of mental status appropriate for ambulatory setting.  Progress: fair to good  Plan: RTC 2mo  Psychotherapy services during this visit included  myself and the patient. Treatment plan to be signed at next visit.  Treatment Plan      SYMPTOMS; PROBLEMS   MEASURABLE GOALS;    FUNCTIONAL IMPROVEMENT INTERVENTIONS;   GAINS MADE DISCHARGE CRITERIA   Depression: depressed mood, anhedonia, low energy, insomnia and concentration problems   reduce depressive symptoms, find enjoyment at least once a day, reduce panic attacks/ excessive worry and reduce feeling overwhelmed/ improve decision making skills acceptance of limitations/reality  community/ family support reduced visit frequency and can transfer back to primary care   Panic Attacks: dizziness, racing heart, SOB, sweating and fear   reduce panic attacks/ excessive worry and make a plan to manage 2-3 anxiety-provoking situations acceptance of limitations/reality  building on strengths  medications  marked symptom improvement and can transfer back to primary care

## 2018-03-13 NOTE — MR AVS SNAPSHOT
After Visit Summary   3/13/2018    Rossi Tavarez    MRN: [id number]           Patient Information     Date Of Birth          1965        Visit Information        Provider Department      3/13/2018 2:30 PM Amy Ricks MD Psychiatry Clinic        Today's Diagnoses     BEE (generalized anxiety disorder)        Attention deficit hyperactivity disorder (ADHD), predominantly inattentive type           Follow-ups after your visit        Follow-up notes from your care team     Return in about 6 weeks (around 2018).      Your next 10 appointments already scheduled     2018  3:30 PM CDT   Adult Med Follow UP with Amy Ricks MD   Psychiatry Clinic ([facility])    [place] [id number]  [address] [address] [phone]              Who to contact     Please call your clinic at [phone] to:    Ask questions about your health    Make or cancel appointments    Discuss your medicines    Learn about your test results    Speak to your doctor            Additional Information About Your Visit        MyChart Information     [email or web address] is an electronic gateway that provides easy, online access to your medical records. With [email or web address], you can request a clinic appointment, read your test results, renew a prescription or communicate with your care team.     To sign up for Akoshat visit the website at [email or web address]   You will be asked to enter the access code listed below, as well as some personal information. Please follow the directions to create your username and password.     Your access code is: [email or web address]  Expires: 2018 10:11 AM     Your access code will  in 90 days. If you need help or a new code, please contact your [facility] or call [phone] for assistance.        Care EveryWhere ID     This is your Care EveryWhere ID. This could be used by other organizations to  access your [place] medical records  [id number]         Blood Pressure from Last 3 Encounters:   02/21/17 141/74   12/27/16 130/80   11/22/16 123/79    Weight from Last 3 Encounters:   02/21/17 54.2 kg (119 lb 6.4 oz)   12/27/16 52.8 kg (116 lb 6.4 oz)   11/22/16 53.1 kg (117 lb)              Today, you had the following     No orders found for display         Today's Medication Changes          These changes are accurate as of 3/13/18 11:59 PM.  If you have any questions, ask your nurse or doctor.               These medicines have changed or have updated prescriptions.        Dose/Directions    gabapentin 300 MG capsule   Commonly known as:  NEURONTIN   This may have changed:    - how much to take  - how to take this  - when to take this  - additional instructions   Used for:  BEE (generalized anxiety disorder)        Dose:  600 mg   Take 2 capsules (600 mg) by mouth 3 times daily   Quantity:  540 capsule   Refills:  1       * methylphenidate ER 54 MG CR tablet   Commonly known as:  CONCERTA   This may have changed:  Another medication with the same name was added. Make sure you understand how and when to take each.   Used for:  Attention deficit hyperactivity disorder (ADHD), predominantly inattentive type        Dose:  54 mg   Take 1 tablet (54 mg) by mouth every morning   Quantity:  30 tablet   Refills:  0       * methylphenidate 10 MG tablet   Commonly known as:  RITALIN   This may have changed:  You were already taking a medication with the same name, and this prescription was added. Make sure you understand how and when to take each.   Used for:  Attention deficit hyperactivity disorder (ADHD), predominantly inattentive type        Dose:  10 mg   Take 1 tablet (10 mg) by mouth 2 times daily   Quantity:  60 tablet   Refills:  0       QUEtiapine 100 MG tablet   Commonly known as:  SEROquel   This may have changed:    - medication strength  - how much to take  - how to take this  - when to take this  -  reasons to take this  - additional instructions   Used for:  BEE (generalized anxiety disorder)        Dose:  100 mg   Take 1 tablet (100 mg) by mouth nightly as needed   Quantity:  90 tablet   Refills:  1       * Notice:  This list has 2 medication(s) that are the same as other medications prescribed for you. Read the directions carefully, and ask your doctor or other care provider to review them with you.         Where to get your medicines      These medications were sent to [id number] - [place], [place] - [address]  [address] [facility] [address]     Phone:  [phone]     DULoxetine 60 MG EC capsule    gabapentin 300 MG capsule    QUEtiapine 100 MG tablet    traZODone 100 MG tablet         Some of these will need a paper prescription and others can be bought over the counter.  Ask your nurse if you have questions.     Bring a paper prescription for each of these medications     clonazePAM 0.5 MG tablet    methylphenidate 10 MG tablet    methylphenidate ER 54 MG CR tablet                Primary Care Provider Office Phone # Fax #    Fei MONAHAN ATIYA Lucas [phone]       [facility] [address]  [facility] [address]        Equal Access to Services     [facility] DOROTEO : Hadii aad ku hadasho Solizette, waaxda luqmahamed, qaybta kaalmada geovany, pat menjivar. So [facility] [phone].    ATENCIÓN: Si habla español, tiene a pastor disposición servicios gratPresbyterian Santa Fe Medical Centeros de asistencia lingüística. Javed al [phone].    We comply with applicable federal civil rights laws and [place] laws. We do not discriminate on the basis of race, color, national origin, age, disability, sex, sexual orientation, or gender identity.            Thank you!     Thank you for choosing PSYCHIATRY CLINIC  for your care. Our goal is always to provide you with excellent care. Hearing back from our patients is one way we can continue to improve our services. Please take a  few minutes to complete the written survey that you may receive in the mail after your visit with us. Thank you!             Your Updated Medication List - Protect others around you: Learn how to safely use, store and throw away your medicines at www.disposemymeds.org.          This list is accurate as of 3/13/18 11:59 PM.  Always use your most recent med list.                   Brand Name Dispense Instructions for use Diagnosis    clonazePAM 0.5 MG tablet    klonoPIN    90 tablet    Take 3 tablets (1.5 mg) by mouth nightly as needed for anxiety    BEE (generalized anxiety disorder)       DULoxetine 60 MG EC capsule    CYMBALTA    180 capsule    Take 2 capsules (120 mg) by mouth daily    BEE (generalized anxiety disorder)       gabapentin 300 MG capsule    NEURONTIN    540 capsule    Take 2 capsules (600 mg) by mouth 3 times daily    BEE (generalized anxiety disorder)       * methylphenidate ER 54 MG CR tablet    CONCERTA    30 tablet    Take 1 tablet (54 mg) by mouth every morning    Attention deficit hyperactivity disorder (ADHD), predominantly inattentive type       * methylphenidate 10 MG tablet    RITALIN    60 tablet    Take 1 tablet (10 mg) by mouth 2 times daily    Attention deficit hyperactivity disorder (ADHD), predominantly inattentive type       QUEtiapine 100 MG tablet    SEROquel    90 tablet    Take 1 tablet (100 mg) by mouth nightly as needed    BEE (generalized anxiety disorder)       traZODone 100 MG tablet    DESYREL    90 tablet    2-3 tablets QHS prn sleep    BEE (generalized anxiety disorder)       * Notice:  This list has 2 medication(s) that are the same as other medications prescribed for you. Read the directions carefully, and ask your doctor or other care provider to review them with you.

## 2018-03-15 ASSESSMENT — PATIENT HEALTH QUESTIONNAIRE - PHQ9: SUM OF ALL RESPONSES TO PHQ QUESTIONS 1-9: 10

## 2018-04-04 NOTE — TELEPHONE ENCOUNTER
Received RF request from patient for:    clonazePAM (KLONOPIN) 0.5 MG tablet 90 tablet 1 11/22/2016  --   Sig: Take 3 tablets (1.5 mg) by mouth nightly as needed for anxiety     methylphenidate ER (CONCERTA) 54 MG CR tablet 30 tablet 0 4/22/2017  No   Sig: Take 1 tablet (54 mg) by mouth every morning     Last RF:    Per [place] :  Methylphenidate - 4/27/17                         Clonazepam - 12/07/16    Due for RF:  yes    Appointment History:  Last appt: 2/21/17  RTC: 1 month  Cancel: once - 3/21/17  No-show: once - 5/02/17  Next appt: 8/08/17    Will route to provider for authorization for controlled substances, as well as patient being outside of RTC timeframe, cancellation and no-show.           abdominal pain

## 2018-04-27 NOTE — TELEPHONE ENCOUNTER
-Rec'd signed Concerta rx from Dr. Ricks dated 3/2/18  -Script discarded as per most recent visit note pt switched to another stimulant d/t cost of Concerta

## 2018-07-24 ENCOUNTER — TELEPHONE (OUTPATIENT)
Dept: PSYCHIATRY | Facility: CLINIC | Age: 53
End: 2018-07-24

## 2018-07-24 DIAGNOSIS — F90.0 ATTENTION DEFICIT HYPERACTIVITY DISORDER (ADHD), PREDOMINANTLY INATTENTIVE TYPE: ICD-10-CM

## 2018-07-24 RX ORDER — METHYLPHENIDATE HYDROCHLORIDE 10 MG/1
10 TABLET ORAL 2 TIMES DAILY
Qty: 60 TABLET | Refills: 0 | Status: SHIPPED | OUTPATIENT
Start: 2018-07-24 | End: 2019-02-19

## 2018-07-24 RX ORDER — METHYLPHENIDATE HYDROCHLORIDE 10 MG/1
10 TABLET ORAL 2 TIMES DAILY
Qty: 60 TABLET | Refills: 0 | Status: SHIPPED | OUTPATIENT
Start: 2018-08-20 | End: 2018-10-16

## 2018-07-24 NOTE — TELEPHONE ENCOUNTER
Care Coordination  Received: Today       Abhilash Berman Katherine J RN       Phone Number: [phone] (Call me)                     Caller:  Self   Medication: Clonazepam 0.5 mg/ Methylphenidate 10 mg   Pharmacy and location:  [facility][id number] - [place], [place] - [address]   [phone] (Phone)   [phone] (Fax)   Have you contacted the pharmacy: Yes   How much of medication do you have left: None   Pending appt: 9/11/18@3:30   Okay to leave VM: Yes     Pt asked if prescriptions could please be mailed out to home address below:   [address]   [facility] [address]     Thanks

## 2018-07-24 NOTE — TELEPHONE ENCOUNTER
Last seen: 3/13/18  RTC: 2 months  Cancel: none  No-show: 4/24/18  Next appt: 9/11/18    Medication requested:   1.  Klonopin 0.5 mg tabs #90 - last filled 4/15/18 per [place]   2.  Ritalin 10 mg tabs #60 - last filled 5/27/18 per [place]     Call placed to [facility]:   -They have refill of Klonopin and will prepare for pt to p/u    Call placed to pt:   -No answer at number provided  -LVM that pt has refill of clonazepam is available at pharmacy  -Will need to obtain Dr. Ricks signature on methylphenidate rx  -Dr. Ricks is out of clinic today but should be returning tmrw  -Offered that writer would call pt once methylphenidate scripts have been signed  -Clinic number provided for c/b if needed before then

## 2018-07-26 NOTE — TELEPHONE ENCOUNTER
-Ritalin scripts signed by Dr. Ricks  -Both mailed to pt's [place] address[place]    [address] [facility] [address]      -[facility] for pt with this update

## 2018-08-21 DIAGNOSIS — F41.1 GAD (GENERALIZED ANXIETY DISORDER): ICD-10-CM

## 2018-08-21 RX ORDER — DULOXETIN HYDROCHLORIDE 60 MG/1
120 CAPSULE, DELAYED RELEASE ORAL DAILY
Qty: 60 CAPSULE | Refills: 0 | Status: SHIPPED | OUTPATIENT
Start: 2018-08-21 | End: 2018-10-16

## 2018-08-21 RX ORDER — QUETIAPINE FUMARATE 100 MG/1
100 TABLET, FILM COATED ORAL
Qty: 30 TABLET | Refills: 0 | Status: SHIPPED | OUTPATIENT
Start: 2018-08-21 | End: 2018-10-16

## 2018-08-21 NOTE — TELEPHONE ENCOUNTER
Last seen: 3/13  RTC: 6 weeks  Cancel: None  No-show: 4/24  Next appt: 9/11     Incoming refill from [facility] via fax     Medication requested: DULoxetine (CYMBALTA) 60 MG EC capsule  Directions: Take 2 capsules (120 mg) by mouth daily  Qty: 180     Medication requested: QUEtiapine (SEROQUEL) 100 MG tablet  Directions: Take 1 tablet (100 mg) by mouth nightly as needed   Qty: 90     Refill routed to provider due to refill protocol (no-show).

## 2018-08-21 NOTE — TELEPHONE ENCOUNTER
- Writer confirmed that med orders were sent to pharmacy.  E-Prescribing Status: Receipt confirmed by pharmacy (8/21/2018  1:10 PM CDT)

## 2018-09-18 NOTE — TELEPHONE ENCOUNTER
FW: Refill Request  Received: Yesterday       Jil Turner, Jil Car, RN       Phone Number: [phone]                       Previous Messages       ----- Message -----      From: Jessi Burch      Sent: 9/17/2018   2:23 PM        To: Latisha Frausto RN   Subject: Refill Request                                   Caller: Keesha   Medication:  Ritalin 10 mg taken twice a day   Pharmacy and location:  [facility]   Have you contacted the pharmacy: Need Hard Copy   How much of medication do you have left:  Yes   Pending appt: Yes   Okay to leave VM:  Yes     Thanks!

## 2018-09-18 NOTE — TELEPHONE ENCOUNTER
Last seen: 3/13/18  RTC: 2 months  Cancel: none  No-show: 4/24/18 and 9/11/18   Next appt: 10/16/18     Medication requested: methylphenidate (RITALIN) 10 MG tablet  Directions: Take 1 tablet (10 mg) by mouth 2 times daily   Qty: 60  Last refilled: 7/31/18, 5/27/18, and 4/15/18 per [place]      -Writer reviewed chart and it appears multiple scripts were printed and sent to pt on 7/24. Script was printed with start date of 8/20. Appears pt has not filled this yet. Called pt at the number in the previous message to gather more information regarding the remaining script. No answer. LVM requesting a c/b.

## 2018-10-16 ENCOUNTER — OFFICE VISIT (OUTPATIENT)
Dept: PSYCHIATRY | Facility: CLINIC | Age: 53
End: 2018-10-16
Attending: PSYCHIATRY & NEUROLOGY
Payer: MEDICARE

## 2018-10-16 DIAGNOSIS — F90.0 ATTENTION DEFICIT HYPERACTIVITY DISORDER (ADHD), PREDOMINANTLY INATTENTIVE TYPE: ICD-10-CM

## 2018-10-16 DIAGNOSIS — F41.1 GAD (GENERALIZED ANXIETY DISORDER): ICD-10-CM

## 2018-10-16 RX ORDER — METHYLPHENIDATE HYDROCHLORIDE 10 MG/1
TABLET ORAL
Qty: 90 TABLET | Refills: 0 | Status: SHIPPED | OUTPATIENT
Start: 2018-10-16 | End: 2018-10-16

## 2018-10-16 RX ORDER — CLONAZEPAM 0.5 MG/1
1.5 TABLET ORAL
Qty: 90 TABLET | Refills: 2 | Status: SHIPPED | OUTPATIENT
Start: 2018-10-16 | End: 2019-01-08

## 2018-10-16 RX ORDER — DULOXETIN HYDROCHLORIDE 60 MG/1
120 CAPSULE, DELAYED RELEASE ORAL DAILY
Qty: 60 CAPSULE | Refills: 3 | Status: SHIPPED | OUTPATIENT
Start: 2018-10-16 | End: 2019-02-19

## 2018-10-16 RX ORDER — GABAPENTIN 300 MG/1
600 CAPSULE ORAL 3 TIMES DAILY
Qty: 540 CAPSULE | Refills: 1 | Status: SHIPPED | OUTPATIENT
Start: 2018-10-16 | End: 2019-02-19

## 2018-10-16 RX ORDER — METHYLPHENIDATE HYDROCHLORIDE 10 MG/1
TABLET ORAL
Qty: 90 TABLET | Refills: 0 | Status: SHIPPED | OUTPATIENT
Start: 2018-10-16 | End: 2019-01-08

## 2018-10-16 RX ORDER — TRAZODONE HYDROCHLORIDE 100 MG/1
TABLET ORAL
Qty: 90 TABLET | Refills: 3 | Status: SHIPPED | OUTPATIENT
Start: 2018-10-16 | End: 2019-02-19

## 2018-10-16 NOTE — MR AVS SNAPSHOT
After Visit Summary   10/16/2018    Rossi Tavarez    MRN: [id number]           Patient Information     Date Of Birth          1965        Visit Information        Provider Department      10/16/2018 2:30 PM Amy Ricks MD Psychiatry Clinic        Today's Diagnoses     Attention deficit hyperactivity disorder (ADHD), predominantly inattentive type        BEE (generalized anxiety disorder)           Follow-ups after your visit        Follow-up notes from your care team     Return in about 2 months (around 2018).      Who to contact     Please call your clinic at [phone] to:    Ask questions about your health    Make or cancel appointments    Discuss your medicines    Learn about your test results    Speak to your doctor            Additional Information About Your Visit        MyChart Information     Bandwagonhart is an electronic gateway that provides easy, online access to your medical records. With Aluwave, you can request a clinic appointment, read your test results, renew a prescription or communicate with your care team.     To sign up for [employer] visit the website at [email or web address]   You will be asked to enter the access code listed below, as well as some personal information. Please follow the directions to create your username and password.     Your access code is: [email or web address]  Expires: 2019  1:55 PM     Your access code will  in 90 days. If you need help or a new code, please contact your [facility] or call [phone] for assistance.        Care EveryWhere ID     This is your Care EveryWhere ID. This could be used by other organizations to access your [place] [facility] medical records  [id number]         Blood Pressure from Last 3 Encounters:   17 141/74   16 130/80   16 123/79    Weight from Last 3 Encounters:   17 54.2 kg (119 lb 6.4 oz)   16 52.8 kg (116 lb 6.4 oz)   16 53.1 kg (117 lb)               Today, you had the following     No orders found for display         Today's Medication Changes          These changes are accurate as of 10/16/18  3:55 PM.  If you have any questions, ask your nurse or doctor.               These medicines have changed or have updated prescriptions.        Dose/Directions    * methylphenidate ER 54 MG CR tablet   Commonly known as:  CONCERTA   This may have changed:  Another medication with the same name was added. Make sure you understand how and when to take each.   Used for:  Attention deficit hyperactivity disorder (ADHD), predominantly inattentive type        Dose:  54 mg   Take 1 tablet (54 mg) by mouth every morning   Quantity:  30 tablet   Refills:  0       * methylphenidate 10 MG tablet   Commonly known as:  RITALIN   This may have changed:  Another medication with the same name was added. Make sure you understand how and when to take each.   Used for:  Attention deficit hyperactivity disorder (ADHD), predominantly inattentive type        Dose:  10 mg   Take 1 tablet (10 mg) by mouth 2 times daily   Quantity:  60 tablet   Refills:  0       * methylphenidate 10 MG tablet   Commonly known as:  RITALIN   This may have changed:  You were already taking a medication with the same name, and this prescription was added. Make sure you understand how and when to take each.   Used for:  Attention deficit hyperactivity disorder (ADHD), predominantly inattentive type        Take 2 in the am and one at noon   Quantity:  90 tablet   Refills:  0       * Notice:  This list has 3 medication(s) that are the same as other medications prescribed for you. Read the directions carefully, and ask your doctor or other care provider to review them with you.      Stop taking these medicines if you haven't already. Please contact your care team if you have questions.     QUEtiapine 100 MG tablet   Commonly known as:  SEROquel                Where to get your medicines      These  medications were sent to [facility][id number] - [place], [place] - [address]  [address], [facility] [address]     Phone:  [phone]     DULoxetine 60 MG EC capsule    gabapentin 300 MG capsule    traZODone 100 MG tablet         Some of these will need a paper prescription and others can be bought over the counter.  Ask your nurse if you have questions.     Bring a paper prescription for each of these medications     clonazePAM 0.5 MG tablet    methylphenidate 10 MG tablet                Primary Care Provider Office Phone # Fax #    Fei Lucas [phone]       [facility] [address]  [facility] [address]        Equal Access to Services     JOSUÉ SADLER : Hadii amita wilcoxo Raheel, waaxda luqadaha, qaybta kaalmada geovany, pat menjivar. So [facility] [phone].    ATENCIÓN: Si habla español, tiene a pastor disposición servicios gratuitos de asistencia lingüística. Llame al [phone].    We comply with applicable federal civil rights laws and [place] laws. We do not discriminate on the basis of race, color, national origin, age, disability, sex, sexual orientation, or gender identity.            Thank you!     Thank you for choosing PSYCHIATRY CLINIC  for your care. Our goal is always to provide you with excellent care. Hearing back from our patients is one way we can continue to improve our services. Please take a few minutes to complete the written survey that you may receive in the mail after your visit with us. Thank you!             Your Updated Medication List - Protect others around you: Learn how to safely use, store and throw away your medicines at www.disposemymeds.org.          This list is accurate as of 10/16/18  3:55 PM.  Always use your most recent med list.                   Brand Name Dispense Instructions for use Diagnosis    clonazePAM 0.5 MG tablet    klonoPIN    90 tablet    Take 3 tablets (1.5 mg) by mouth  nightly as needed for anxiety    BEE (generalized anxiety disorder)       DULoxetine 60 MG EC capsule    CYMBALTA    60 capsule    Take 2 capsules (120 mg) by mouth daily    BEE (generalized anxiety disorder)       gabapentin 300 MG capsule    NEURONTIN    540 capsule    Take 2 capsules (600 mg) by mouth 3 times daily    BEE (generalized anxiety disorder)       * methylphenidate ER 54 MG CR tablet    CONCERTA    30 tablet    Take 1 tablet (54 mg) by mouth every morning    Attention deficit hyperactivity disorder (ADHD), predominantly inattentive type       * methylphenidate 10 MG tablet    RITALIN    60 tablet    Take 1 tablet (10 mg) by mouth 2 times daily    Attention deficit hyperactivity disorder (ADHD), predominantly inattentive type       * methylphenidate 10 MG tablet    RITALIN    90 tablet    Take 2 in the am and one at noon    Attention deficit hyperactivity disorder (ADHD), predominantly inattentive type       traZODone 100 MG tablet    DESYREL    90 tablet    2-3 tablets QHS prn sleep    BEE (generalized anxiety disorder)       * Notice:  This list has 3 medication(s) that are the same as other medications prescribed for you. Read the directions carefully, and ask your doctor or other care provider to review them with you.

## 2018-10-16 NOTE — PROGRESS NOTES
"Progress Notes  Psychiatry      []Hide copied text  Progress Notes   Amy Ricks MD (Physician)     Psychiatry    Patient was last seen 3/13/2018        IDENTIFYING DATA:  The patient is a 52-year-old  white female, self-referred, who has returned to me for  psychiatric care.  She was a previous patient of mine several years ago and  was last followed in .  At that time she was not able to make it to appointments because of transportation and she was referred back to her primary care physician for followup.    This time, she has missed appointments since March    CC:Fatigue and depression have been the most prominent problems as has chronic fibromyalgia pain.   Fogginess, anxiety also prominent.     INTERIM HX:: The patient has missed 2 appointments: she says she tries to call but cannot get through.      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  She is very motivated an excited for this but for little else.  Her house situation is oppressive because of her 's  on her.  She keeps having dreams about her mom - she misses her so much. She reflects on the positive personality changes toward the end - She was present when she .       MED: the patient reports that the\" intestinal cells are replacing her gastric cells\" and it is precancerous. She has gastritis. She also  has had uncontrolled diarrhea for unexplained reasons;  This is now improved. . She has been on meds for her gastritis but she does not recall which one..  Food still gets stuck but is somewhat better.      She is very distressed by feeling foggy and gets lost.      FH:  with stomach cancer.  He has heart disease and multiple other problems.  HE finished his last chemo. He still is active.  It is extremely difficult to live with him because he is so critical and controlling.    Fibromyalgia: lots of pain everywhere  Endoscopy done 1-2 mo ago. She " may still have gastritis.   Back pain    The patient is out of Concerta,  It has helped concentration and focus.  Since She has missed follow-up appts, she has been off of it for a month. .    Anxiety and mood have been helped by seroquel in the past.. Concerta definietly helps focus and concentration- we had to switch to methylphenidate IR because of insurance.         She has been maintained on the following psychiatric medications :    1.  Cymbalta 120 mg daily  -it has likely helped  2.  Methylphenidate IR 10 mg bid.   This improves her mental cloudiness and mood.  It also helps energy.     4.  BuSpar 40 mg q a.m. and 30 mg q p.m.  -finds this helpful  5.  Klonopin 0.5 mg- 1.5 mg q hs for sleep. One nightly unless she gets restless legs and then she takes 3. Helps to get asleep  6. Estradiol - out for 3 weeks.  Hot flashes.   7. Levothyroxine   9. Maxalt- migraines 2-3/week and then a few weeks without any. This occurs with IBS.    10. Compazine- nausea during migraines.   11. gabapentin 600 tid prn -  12. Trazodone 200 at bedtime- helps to stay asleep.     She stopped Seroquel secondary to dry mouth.  ROS:   Migraines are bad; every month,         PAST PSYCHIATRIC HISTORY:  REVIEWED PREVIOUSLY. SUMMARIZED HERE History of depression and anxiety are longstanding.  She has been on several medications.   Zoloft which helped.   Paxil also helped.  She was on Effexor which was okay but gave her a dry mouth.  There was some problem from Lexapro.  She is unsure if she was on Celexa.  Probably she was on Prozac.  Trazodone gave her a dry mouth.  She was never on Wellbutrin largely because of her history of seizures 10 years ago.  She was on Lyrica more for pain but it caused swelling and shaking involuntary movements.  Gabapentin caused neck stiffness.  Her recollection is that seroquel and lamictal were helpful for mood.       MEDICAL HISTORY:  SUMMARIZED The patient has had fibromyalgia for many years and has severely  limited her activities and effected her mood.     Her neck always has tension and she has ongoing problems with migraines. She was on oxycodone 20 mg for many years and it did help with pain, anxiety and reflux, but has been off for a few months. . Ite was discontinued because of hyperalgesia.  She is now doing physical therapy.  She has noted significant limitations in her daily function because of being off of opiates.        SOCIAL HISTORY:  As above. The patient has been  for 20 years to Jamal who is now retired.  He has a serious illness as described above. Uncertain course. She has remained in this relationship because of finances and obligation. A very positive note is that her daughter had her baby and January is the zoran of her life.   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  This is the high point in her life and she is in a good mood when she is there with the baby.  She reports that she has never been closer to her daughter Alecia than now. She is not at all confident that she can live on her own though her daughter has given her positive feedback.        REVIEW OF SYSTEMS:   headaches, pain and fatigue.  Remainder of ten-point review of systems negative except as noted above.        MENTAL STATUS EXAMINATION:  The patient is very slender, appears her stated age. Oriented x3.  Mood is anxious  , affect consistent.   Speech is regular rate and rhythm. Language intact. .  Thought processes are logical and goal directed..  Thought content is negative for suicidality and psychotic symptoms.  Thought associations are clear.  .  Fund of knowledge is good.  Insight and judgment are good, concentration normal. Fund of knowledge ok.       ASSESSMENT:  This is a 51-year-old  white female has  major depression, BEE, panic disorder, ADHD and fibromyalgia, chronic pain a result.  She is in a very difficult relationship and has high level of anxiety and depression.          DIAGNOSES:    1. Major  depressive disorder, recurrent, mod     2. Generalized anxiety disorder.     3. Panic disorder.    4. Attention deficit disorder.    5. Bulimia nervosa in full remission.    6. Fibromyalgia.    7. GERD.        PLAN: Continue current medications   --Seroquel dced  -- gabapentin  600 tid  --INcrease methyphenidate 20 mg Qam and 10 mg bid for concerta due to cost  --RTC 2 mo.            I spent a total of 30 minutes face-to-face with Rossi BRANDON Corby during today's office visit.  Over 50% of this time was spent counseling the patient and/or coordinating care regarding the complicated regimen and choices of medications.  See note for details.        CHANEL GREEN MD     Psychiatry Clinic Individual Psychotherapy Note                                                                     [16]   Start time - 230pm     End time - 246 pm  Date last reviewed -10-16-18 Date next due - 1-16-19  Subjective: This supportive psychotherapy session addressed issues related to current stressors consisting of  and his illness, pain condition, finances.  Patient's reaction: Preparatory in the context of mental status appropriate for ambulatory setting.  Progress: fair to good  Plan: RTC 2mo  Psychotherapy services during this visit included  myself and the patient.     Treatment Plan      SYMPTOMS; PROBLEMS   MEASURABLE GOALS;    FUNCTIONAL IMPROVEMENT INTERVENTIONS;   GAINS MADE DISCHARGE CRITERIA   Depression: depressed mood, anhedonia, low energy, insomnia and concentration problems   reduce depressive symptoms, find enjoyment at least once a day, reduce panic attacks/ excessive worry and reduce feeling overwhelmed/ improve decision making skills acceptance of limitations/reality  community/ family support reduced visit frequency and can transfer back to primary care   Panic Attacks: dizziness, racing heart, SOB, sweating and fear   reduce panic attacks/ excessive worry and make a plan to manage 2-3 anxiety-provoking  situations acceptance of limitations/reality  building on strengths  medications , less unless  conflicts marked symptom improvement and can transfer back to primary care

## 2018-11-06 ASSESSMENT — PATIENT HEALTH QUESTIONNAIRE - PHQ9: SUM OF ALL RESPONSES TO PHQ QUESTIONS 1-9: 13

## 2019-01-08 DIAGNOSIS — F90.0 ATTENTION DEFICIT HYPERACTIVITY DISORDER (ADHD), PREDOMINANTLY INATTENTIVE TYPE: ICD-10-CM

## 2019-01-08 DIAGNOSIS — F41.1 GAD (GENERALIZED ANXIETY DISORDER): ICD-10-CM

## 2019-01-08 RX ORDER — CLONAZEPAM 0.5 MG/1
1.5 TABLET ORAL
Qty: 90 TABLET | Refills: 0 | Status: SHIPPED | OUTPATIENT
Start: 2019-01-08 | End: 2019-02-19

## 2019-01-08 RX ORDER — METHYLPHENIDATE HYDROCHLORIDE 10 MG/1
TABLET ORAL
Qty: 90 TABLET | Refills: 0 | Status: SHIPPED | OUTPATIENT
Start: 2019-01-08 | End: 2019-10-08

## 2019-01-08 NOTE — TELEPHONE ENCOUNTER
Refill Request (Ritalin and Clonazepam )     Amy Ricks MD   You 41 minutes ago (1:11 PM)      These doses are correct listed below. 30 days is fine;  She needs an appt however.   Thanks,   SS (Routing comment)      - Meds refilled by providers approval   - Med tab changed to reflect this   - Scripts placed in providers folder to be signed     Call to patient at [phone]  - No answer at number provided   - LVM, requesting a call back   - Clinic number provided

## 2019-01-08 NOTE — TELEPHONE ENCOUNTER
Last seen: 10/16/18  RTC: 2 months   Cancel: none   No-show: none   Next appt: 2/5/19    Incoming refill from patient via phone     Medication requested: methylphenidate (RITALIN) 10 MG tablet  Directions: Take 2 in the am and one at noon  Qty: 90  Last refilled: 12/6/18, 10/22/18, 7/31/18    Medication requested:clonazePAM (KLONOPIN) 0.5 MG tablet   Directions:Take 3 tablets (1.5 mg) by mouth nightly as needed for anxiety - Oral   Qty: 90  Last refilled: 10/22/18, 8/3/18, 4/15/18    Medications does not match the plan during the last office visit. Will route to provider to confirm the dose of med's and approval for a refill.

## 2019-01-08 NOTE — TELEPHONE ENCOUNTER
[facility] Call Center    Phone Message    May a detailed message be left on voicemail: yes    Reason for Call: Medication Refill Request    Has the patient contacted the pharmacy for the refill? Yes   Name of medication being requested: ritalin 10mg and clonazepam 0.5mg  Provider who prescribed the medication: Amy Ricks MD  Pharmacy: [id number] [place] [id number]  Date medication is needed: 1/11/19      Action Taken: Message routed to:  Other: JADEN Bishop

## 2019-01-09 ENCOUNTER — TELEPHONE (OUTPATIENT)
Dept: PSYCHIATRY | Facility: CLINIC | Age: 54
End: 2019-01-09

## 2019-01-09 NOTE — TELEPHONE ENCOUNTER
- Writer received signed script by provider  - Scripts mailed out to [facility] pharmacy in [place] per patients request   - No further action needed by this writer

## 2019-01-09 NOTE — TELEPHONE ENCOUNTER
Social Work  Outgoing Voicemail Message  [facility] Psychiatry Clinic      Left a detailed voicemail message for Rossi Tavarez. SW introduced self and reason for call: patient requesting resources (see message below regarding grief resources).       Writer requested call back. Included writer's direct contact information and availability. SW did not provide specific resources as unsure of confidentiality of voicemail.    Plan: If patient returns call, the following resources may be offered:      Adilia Hickman  [facility], [facility]  [address]  [place], [place] [address] [phone]       Yari Velasquez  [facility]  [address]   [place], [place] [address]  [phone]       Sylvia Leary-PAWAN Falcon, [facility]          ----- Message from Nani Holguin sent at 1/8/2019  2:38 PM CST -----  Regarding: grief counseling?  Contact: [phone]  Demetrio Mojica,    This patient sees Dr. Ricks and says she recently lost her . She called today to see if we have any resources for grief counseling. She lives in [place] - do you know of any counselors out there?    Thank you,  Nani

## 2019-02-19 ENCOUNTER — OFFICE VISIT (OUTPATIENT)
Dept: PSYCHIATRY | Facility: CLINIC | Age: 54
End: 2019-02-19
Attending: PSYCHIATRY & NEUROLOGY
Payer: COMMERCIAL

## 2019-02-19 DIAGNOSIS — F41.1 GAD (GENERALIZED ANXIETY DISORDER): ICD-10-CM

## 2019-02-19 DIAGNOSIS — F90.0 ATTENTION DEFICIT HYPERACTIVITY DISORDER (ADHD), PREDOMINANTLY INATTENTIVE TYPE: ICD-10-CM

## 2019-02-19 RX ORDER — GABAPENTIN 300 MG/1
600 CAPSULE ORAL 3 TIMES DAILY
Qty: 540 CAPSULE | Refills: 1 | Status: SHIPPED | OUTPATIENT
Start: 2019-02-19 | End: 2019-10-08

## 2019-02-19 RX ORDER — DULOXETIN HYDROCHLORIDE 60 MG/1
120 CAPSULE, DELAYED RELEASE ORAL DAILY
Qty: 60 CAPSULE | Refills: 3 | Status: SHIPPED | OUTPATIENT
Start: 2019-02-19 | End: 2019-04-23

## 2019-02-19 RX ORDER — TRAZODONE HYDROCHLORIDE 100 MG/1
TABLET ORAL
Qty: 90 TABLET | Refills: 3 | Status: SHIPPED | OUTPATIENT
Start: 2019-02-19 | End: 2019-04-23

## 2019-02-19 RX ORDER — METHYLPHENIDATE HYDROCHLORIDE 20 MG/1
TABLET ORAL
Qty: 45 TABLET | Refills: 0 | Status: SHIPPED | OUTPATIENT
Start: 2019-02-19 | End: 2019-03-26

## 2019-02-19 RX ORDER — CLONAZEPAM 0.5 MG/1
1.5 TABLET ORAL
Qty: 90 TABLET | Refills: 0 | Status: SHIPPED | OUTPATIENT
Start: 2019-02-19 | End: 2019-03-26

## 2019-02-19 NOTE — PROGRESS NOTES
"Progress Notes  Psychiatry      []Hide copied text  Progress Notes   Amy Ricks MD (Physician)     Psychiatry    Patient was last seen 10/16/2018        IDENTIFYING DATA:  The patient is a 52-year-old  white female, self-referred, who has returned to me for  psychiatric care.  She was a previous patient of mine several years ago and  was last followed in .  At that time she was not able to make it to appointments because of transportation and she was referred back to her primary care physician for followup.         INTERIM HX:: Her   2018.  For one year at least, he was really toxic to her.  He did not have a will so the estate will be in probate.  He was $20,000 in debt and has left her with many problems. She was totally dependent on his finances and he kept her completely in the dark.      The patient has not     The patient reports that the eating disorder returned: purging. She needs to see GI specialist for :food not going down.\" She needs bowel retraining.     She goes t o her daughter's once/week to visit.    She plans to sell the house; she cannot afford it. Multiple problems: there are her 's children who are greedy and she has difficulty asserting herself, her neighbor has been very intrusive; she does not know where to start.     Energy is low, she is not interested in things, she is anxious and depressed, fatigued and confused      MED:from previous visit:  the patient reports that the\" intestinal cells are replacing her gastric cells\" and it is precancerous. She has gastritis. She also  has had uncontrolled diarrhea for unexplained reasons;  This is now improved. . She has been on meds for her gastritis but she does not recall which one..  Food still gets stuck but is somewhat better.        FH:  had stomach cancer and multiple other problems.    Fibromyalgia: lots of pain everywhere      Anxiety and mood have been helped by seroquel in the past.. " Concerta definietly helps focus and concentration- we had to switch to methylphenidate IR because of insurance.         She has been maintained on the following psychiatric medications :    1.  Cymbalta 120 mg daily  -it has likely helped  2.  Methylphenidate IR 10 mg bid.   This improves her mental cloudiness and mood.  It also helps energy.     4.  BuSpar 40 mg q a.m. and 30 mg q p.m.  -finds this helpful  5.  Klonopin 0.5 mg- 1.5 mg q hs for sleep. One nightly unless she gets restless legs and then she takes 3. Helps to get asleep, Usually takes 0.5 bid  6. Estradiol -  7. Levothyroxine   9. Maxalt- migraines 2-3/week and then a few weeks without any. This occurs with IBS.    10. Compazine- nausea during migraines.   11. gabapentin 600 tid prn -  12. Trazodone 200 at bedtime- helps to stay asleep.     She stopped Seroquel secondary to dry mouth.  ROS:   Migraines are bad; every month, Back pain and hip pain        PAST PSYCHIATRIC HISTORY:  REVIEWED PREVIOUSLY. SUMMARIZED HERE History of depression and anxiety are longstanding.  She has been on several medications.   Zoloft which helped.   Paxil also helped.  She was on Effexor which was okay but gave her a dry mouth.  There was some problem from Lexapro.  She is unsure if she was on Celexa.  Probably she was on Prozac.  Trazodone gave her a dry mouth.  She was never on Wellbutrin largely because of her history of seizures 10 years ago.  She was on Lyrica more for pain but it caused swelling and shaking involuntary movements.  Gabapentin caused neck stiffness.  Her recollection is that seroquel and lamictal were helpful for mood.       MEDICAL HISTORY:  SUMMARIZED The patient has had fibromyalgia for many years and has severely limited her activities and effected her mood.     Her neck always has tension and she has ongoing problems with migraines. She was on oxycodone 20 mg for many years and it did help with pain, anxiety and reflux, but has been off for a few  months. . Ite was discontinued because of hyperalgesia.  She is now doing physical therapy.  She has noted significant limitations in her daily function because of being off of opiates.        SOCIAL HISTORY:  As above. The patient has been  for 20 years to Jamal who is now retired.  He has a serious illness as described above. Uncertain course. She has remained in this relationship because of finances and obligation. A very positive note is that her daughter had her baby and January is the zoran of her life.   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  This is the high point in her life and she is in a good mood when she is there with the baby.  She reports that she has never been closer to her daughter Alecia than now. She is not at all confident that she can live on her own though her daughter has given her positive feedback.        REVIEW OF SYSTEMS:   headaches, pain and fatigue.  Remainder of ten-point review of systems negative except as noted above.        MENTAL STATUS EXAMINATION:  The patient is very slender, appears her stated age. Oriented x3.  Mood is anxious  , affect consistent.   Speech is regular rate and rhythm. Language intact. .  Thought processes are logical and goal directed..  Thought content is negative for suicidality and psychotic symptoms.  Thought associations are clear.  .  Fund of knowledge is good.  Insight and judgment are good, concentration normal.       ASSESSMENT:  This is a 51-year-old  white female has  major depression, BEE, panic disorder, ADHD and fibromyalgia, chronic pain a result.  Her   leaving her in a difficult financial and emotional situation. She has a high level of anxiety and depression.          DIAGNOSES:    1. Major depressive disorder, recurrent, mod     2. Generalized anxiety disorder.     3. Panic disorder.    4. Attention deficit disorder.    5. Bulimia nervosa in full remission.    6. Fibromyalgia.    7. GERD.        PLAN: Continue  current medications   --Seroquel dced  -- gabapentin  600 tid  --INcrease methyphenidate 20 mg Qam and 10 mg bid for concerta due to cost  --RTC 1 mo.   --refer to social work for assistance finding a therapist and navigating through the next several months      CHANEL GREEN MD     Psychiatry Clinic Individual Psychotherapy Note                                                                     [16]   Start time - 130pm     End time -146 pm  Date last reviewed -10-16-18 Date next due - 1-16-19  Subjective: This supportive psychotherapy session addressed issues related to current stressors consisting of  and his illness, pain condition, finances.  Patient's reaction: Preparatory in the context of mental status appropriate for ambulatory setting.  Progress: fair to good  Plan: RTC 1mo  Psychotherapy services during this visit included  myself and the patient.     Treatment Plan      SYMPTOMS; PROBLEMS   MEASURABLE GOALS;    FUNCTIONAL IMPROVEMENT INTERVENTIONS;   GAINS MADE DISCHARGE CRITERIA   Depression: depressed mood, anhedonia, low energy, insomnia and concentration problems   reduce depressive symptoms, find enjoyment at least once a day, reduce panic attacks/ excessive worry and reduce feeling overwhelmed/ improve decision making skills acceptance of limitations/reality  community/ family support reduced visit frequency and can transfer back to primary care   Panic Attacks: dizziness, racing heart, SOB, sweating and fear   reduce panic attacks/ excessive worry and make a plan to manage 2-3 anxiety-provoking situations acceptance of limitations/reality  building on strengths  medications , less unless  conflicts marked symptom improvement and can transfer back to primary care

## 2019-02-21 ENCOUNTER — TELEPHONE (OUTPATIENT)
Dept: PSYCHIATRY | Facility: CLINIC | Age: 54
End: 2019-02-21

## 2019-02-21 NOTE — TELEPHONE ENCOUNTER
Social Work  Outgoing Voicemail Message  [facility] Psychiatry Clinic      Left a detailed voicemail message for Rossi Tavarez. Patient referred by Dr. Ricks. SW requested call back to discuss current concerns and stressors so that writer could provide some resources or support.    SW offered following resource: [facility]    [phone]   [address] [place], [place] [address]    Writer requested call back. Included writer's direct contact information and availability.     Plan: SW will follow up with patient by phone or at next clinic appointment.        PAWAN Villanueva, LICSW

## 2019-03-26 ENCOUNTER — OFFICE VISIT (OUTPATIENT)
Dept: PSYCHIATRY | Facility: CLINIC | Age: 54
End: 2019-03-26
Attending: PSYCHIATRY & NEUROLOGY
Payer: COMMERCIAL

## 2019-03-26 ENCOUNTER — ALLIED HEALTH/NURSE VISIT (OUTPATIENT)
Dept: PSYCHIATRY | Facility: CLINIC | Age: 54
End: 2019-03-26
Attending: SOCIAL WORKER
Payer: MEDICARE

## 2019-03-26 DIAGNOSIS — F41.1 GAD (GENERALIZED ANXIETY DISORDER): ICD-10-CM

## 2019-03-26 DIAGNOSIS — F90.0 ATTENTION DEFICIT HYPERACTIVITY DISORDER (ADHD), PREDOMINANTLY INATTENTIVE TYPE: ICD-10-CM

## 2019-03-26 DIAGNOSIS — Z71.89 COUNSELING AND COORDINATION OF CARE: Primary | ICD-10-CM

## 2019-03-26 RX ORDER — METHYLPHENIDATE HYDROCHLORIDE 20 MG/1
20 TABLET ORAL 2 TIMES DAILY
Qty: 60 TABLET | Refills: 0 | Status: SHIPPED | OUTPATIENT
Start: 2019-03-26 | End: 2019-04-23

## 2019-03-26 RX ORDER — CLONAZEPAM 0.5 MG/1
1.5 TABLET ORAL
Qty: 90 TABLET | Refills: 0 | Status: SHIPPED | OUTPATIENT
Start: 2019-03-26 | End: 2019-07-02

## 2019-03-26 NOTE — PATIENT INSTRUCTIONS
Thank you for coming to the PSYCHIATRY CLINIC.    Lab Testing:  If you had lab testing today and your results are reassuring or normal they will be mailed to you or sent through [employer] within 7 days.   If the lab tests need quick action we will call you with the results.  The phone number we will call with results is # [phone] (home) . If this is not the best number please call our clinic and change the number.    Medication Refills:  If you need any refills please call your pharmacy and they will contact us. Our fax number for refills is [phone]. Please allow three business for refill processing.   If you need to  your refill at a new pharmacy, please contact the new pharmacy directly. The new pharmacy will help you get your medications transferred.     Scheduling:  If you have any concerns about today's visit or wish to schedule another appointment please call our office during normal business hours [phone] (8-5:00 M-F)    Contact Us:  Please call [phone] during business hours (8-5:00 M-F).  If after clinic hours, or on the weekend, please call  [phone].    Financial Assistance [phone]  [facility] [phone]  [employer] [facility] [phone]  Medical Records [phone]      MENTAL HEALTH CRISIS NUMBERS:  [facility]:    - [phone]   Crisis Residence [facility] - [phone]   Walk-In Counseling [facility] [phone]   COPE 24/7 [facility] Mobile Team for Adults - [[phone]]; Child - [[phone]]        [facility]:   [facility] - [phone]   Walk-in counseling [facility] - [phone]   Walk-in counseling [facility] - [phone]   Crisis Residence [facility] Residence - [phone]   Urgent Care Adult Mental Health:   --Drop-in, 24/7 crisis line, and [place] Mobile Team [[phone]]    CRISIS TEXT LINE: Text [phone] from anywhere,  anytime, any crisis 24/7;    OR SEE [email or web address].org     Poison Control Center - [phone]    CHILD: [facility] needs assessment team - [phone]     [facility] - [phone]; or [facility] - [phone]    If you have a medical emergency please call 911or go to the nearest ER.                    _____________________________________________    Again thank you for choosing PSYCHIATRY CLINIC and please let us know how we can best partner with you to improve you and your family's health.  You may be receiving a survey in the mail regarding this appointment. We would love to have your feedback, both positive and negative, so please fill out the survey and return it using the provided envelope. The survey is done by an external company, so your answers are anonymous.

## 2019-03-26 NOTE — PROGRESS NOTES
Progress Notes  Psychiatry      []Hide copied text  Progress Notes   Amy Ricks MD (Physician)     Psychiatry    Patient was last seen 19        IDENTIFYING DATA:  The patient is a 52-year-old  white female, self-referred, who has returned to me for  psychiatric care.  She was a previous patient of mine several years ago and  was last followed in .  At that time she was not able to make it to appointments because of transportation and she was referred back to her primary care physician for followup.         INTERIM HX::  The patient was last seen one month ago. The plan was:  -Seroquel dced  -- gabapentin  600 tid  --INcrease methyphenidate 20 mg Qam and 10 mg qday for concerta due to cost  --RTC 1 mo.   --refer to social work for assistance finding a therapist and navigating through the next several months     The patient reports that the increase in Ritalin helped thinking clarity and was better able to remember things.  She did not forget conversations. She also reports being calmer.Also less fidgety or restless. Depression still present but not extreme. Come nighttime she doesn't want to do anything.  She sometimes has high appetite, sometimes very low. No purging but binges.  Her stomach problems include diarrhea. She has a new diet for irritable bowel. Nighttimes are problematic for eating.  Sleep is good. The last few days she has had restless legs, ghanshyam at night. Usually she takes one Klonopin and this helps but may take up to 3, which she sometimes does.  Motivation, interests: not really.  She has lots of books but loses interest. She gets outside and this makes her happy.      Social isolation continues to be a problem. She often cancels because of pain. She has lost touch with    Her   2018.  For one year at least, he was really toxic to her.  He did not have a will so the estate will be in probate.  He was $20,000 in debt and has left her with many problems.  "She was totally dependent on his finances and he kept her completely in the dark.        She goes t o her daughter's twice/week to .    She plans to sell the house; she cannot afford it. Multiple problems: collapse of a  roof and there was no insurance on it. Probate is in process.  She was successful at stopping her neighbor from being intrusive and knocking on her door. there are her 's children who are greedy and she has difficulty asserting herself. Her daughter has been helping navigate the many aspects of the estate including communicating with the .     MED:from previous visit:  the patient reports that the\" intestinal cells are replacing her gastric cells\" and it is precancerous. She has gastritis. She also  has had uncontrolled diarrhea for unexplained reasons;   . She has been on meds for her gastritis but she does not recall which one..  Food still gets stuck but is somewhat better.        FH:  had stomach cancer and multiple other problems.    Fibromyalgia: lots of pain everywhere especiallly in the eves.      Anxiety and mood have been helped by seroquel in the past.. Concerta definietly helps focus and concentration- we had to switch to methylphenidate IR because of insurance.         She has been maintained on the following psychiatric medications :    1.  Cymbalta 120 mg daily  -it has likely helped  2.  Methylphenidate IR 20 mg in am and 10 Qnoon  This improves her mental cloudiness and mood.  It also helps energy.     4.  BuSpar 40 mg q a.m. and 30 mg q p.m.  -finds this helpful  5.  Klonopin 0.5 mg- 1.5 mg q hs for sleep. One nightly unless she gets restless legs and then she takes 3. Helps to get asleep, Usually takes 0.5 bid  6. Estradiol -  7. Levothyroxine   9. Maxalt- migraines 2-3/week and then a few weeks without any. This occurs with IBS.    10. Compazine- nausea during migraines.   11. gabapentin 600 tid prn -  12. Trazodone 200 at bedtime- helps to stay " asleep.     She stopped Seroquel secondary to dry mouth.          PAST PSYCHIATRIC HISTORY:  REVIEWED PREVIOUSLY. SUMMARIZED HERE History of depression and anxiety are longstanding.  She has been on several medications.   Zoloft which helped.   Paxil also helped.  She was on Effexor which was okay but gave her a dry mouth.  There was some problem from Lexapro.  She is unsure if she was on Celexa.  Probably she was on Prozac.  Trazodone gave her a dry mouth.  She was never on Wellbutrin largely because of her history of seizures 10 years ago.  She was on Lyrica more for pain but it caused swelling and shaking involuntary movements.  Gabapentin caused neck stiffness.  Her recollection is that seroquel and lamictal were helpful for mood.       MEDICAL HISTORY:  SUMMARIZED The patient has had fibromyalgia for many years and has severely limited her activities and effected her mood.     Her neck always has tension and she has ongoing problems with migraines. She was on oxycodone 20 mg for many years and it did help with pain, anxiety and reflux, but has been off for a few months. . Ite was discontinued because of hyperalgesia.  She is now doing physical therapy.  She has noted significant limitations in her daily function because of being off of opiates.        SOCIAL HISTORY:  As above.  A very positive note is that her daughter had her baby and January is the zoran of her life.   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  This is the high point in her life and she is in a good mood when she is there with the baby.  She reports that she has never been closer to her daughter Alecia than now. She is not at all confident that she can live on her own though her daughter has given her positive feedback.        REVIEW OF SYSTEMS:   headaches, pain and fatigue, diarrhea, migraines.  Remainder of ten-point review of systems negative except as noted above.        MENTAL STATUS EXAMINATION:  The patient is very slender, appears  her stated age. Oriented x3.  Mood is anxious but calmer than last month  , affect consistent.   Speech is regular rate and rhythm. Language intact. .  Thought processes are logical and goal directed..  Thought content is negative for suicidality and psychotic symptoms.  Thought associations are clear.  .  Fund of knowledge is good.  Insight and judgment are good, concentration fairly good.       ASSESSMENT:  This is a 51-year-old  white female has  major depression, BEE, panic disorder, ADHD and fibromyalgia, chronic pain a result.  Her   leaving her in a difficult financial and emotional situation. She has a high level of anxiety and depression but improved with increased dose of Ritalin         DIAGNOSES:    1. Major depressive disorder, recurrent, mod     2. Generalized anxiety disorder.     3. Panic disorder.    4. Attention deficit disorder.    5. Bulimia nervosa with episodic binge eating    6. Fibromyalgia.    7. GERD.        PLAN: Continue current medications   -- gabapentin  600 tid  --INcrease methyphenidate 20 mg bid -use this form instead of concerta due to cost  --RTC 1 mo.   -At end of visit, Taisha Falcon joined us to assist in the challenges connected with the death of her .  She was very appreciative for the help.  Finding a grief counselor is one goal and the patient agreed to a call in one week to check in .     CHANLE GREEN MD     Psychiatry Clinic Individual Psychotherapy Note                                                                     [16]   Start time - 240pm     End time -256 pm  Date last reviewed -10-16-18 Date next due - 19  Subjective: This supportive psychotherapy session addressed issues related to current stressors consisting of  and his illness, pain condition, finances.  Patient's reaction: Preparatory in the context of mental status appropriate for ambulatory setting.  Progress: fair to good  Plan: RTC 1mo  Psychotherapy  services during this visit included  myself and the patient.     Treatment Plan      SYMPTOMS; PROBLEMS   MEASURABLE GOALS;    FUNCTIONAL IMPROVEMENT INTERVENTIONS;   GAINS MADE DISCHARGE CRITERIA   Depression: depressed mood, anhedonia, low energy, insomnia and concentration problems   reduce depressive symptoms, find enjoyment at least once a day, reduce panic attacks/ excessive worry and reduce feeling overwhelmed/ improve decision making skills acceptance of limitations/reality  community/ family support reduced visit frequency and can transfer back to primary care   Panic Attacks: dizziness, racing heart, SOB, sweating and fear   reduce panic attacks/ excessive worry and make a plan to manage 2-3 anxiety-provoking situations acceptance of limitations/reality  building on strengths  medications , less unless  conflicts marked symptom improvement and can transfer back to primary care

## 2019-03-27 ASSESSMENT — PATIENT HEALTH QUESTIONNAIRE - PHQ9: SUM OF ALL RESPONSES TO PHQ QUESTIONS 1-9: 17

## 2019-03-27 NOTE — PROGRESS NOTES
Social Work Consultation  [facility]      Patient Name:  Rossi Tavarez  /Age:  1965 (53 year old)    Presenting SW Need(s)/ Reason for visit:  Requested by Dr. Ricks. Patient recently experienced loss of her  and is in need of psychotherapy resources. Patient also dealing with identifying and managing financial affairs since passing of .    Collaborated With:    -Rossi Tavarez  -Dr. Ricks    Intervention:  Participated in the patient's psychiatry appt with Dr Ricks.      -Briefly assessed needs. Patient is experiencing financial distress. Her  did not have a will when he passed. In addition to his wife, he is also survived by 4 children from a previous relationship. It appears estate is in probate and Rossi is waiting to be appointed as executor of estate. An airplane  belonging to her  collapsed under weight of snow this past winter. There is no insurance to cover losses. Rossi noted that many payments were on auto pay and she does not know something is not paid until she receives notice of a late payment. Her  previously took care of all of the finances. Unbeknownst to Rossi, he was paying for their living expenses with credit. Rossi reports difficulty with setting boundaries with others and that she has not grieved for either her  or her mother.  -Discussed therapy referral. FRANCO had previously shared information on [facility], which has clinics closer to her home. Rossi's home phone was cut off and she did not receive the information. Rossi reports having a few therapists through their clinic and did not have the best experience. Writer reviewed that she suggested clinic as they appear to have merged with [facility]. Writer feels Rossi would benefit from therapist specializing in grief. [facility] also appears to have some financial counseling supports. SW also offered to meet after her appointment to identify  alternative therapy referrals. SW also offered to help call and start referral process. Rossi stated she would call herself and request to meet with a grief therapist.  -Briefly discussed financial concerns. Rossi has not heard back from  in some time. Writer offered to call to get a check in about progress. Patient reports this costs $300/hour and she cannot afford this right now. Her daughter has helped her write an email in the past requesting more information and Rossi found this helpful. Writer encouraged Rossi to request update at regular intervals so that she can feel comfortable in knowing progress.    Resources Provided:  -None at current session    Social Work Assessment:  Rossi has experienced many changes in her life in the past several months. She lost her  and is now trying to deal with the after effects of his financial affairs. She reports on-going grief regarding the losses of her  and her mother. She has some family supports. She does report difficulty in setting boundaries, which may make it more difficult in getting her needs met in a variety of settings.      Plan:  Rossi is to call [facility] and set up meeting with therapist special ising in grief. Writer will follow up with Rossi via phone in 1 week to check on progress and provide on-going support.    Provided patient with writer's contact information and availability.      Will route to patient's psychiatric provider(s) as an FYI.    Sylvia Sampson, [facility]    This is a non-billable encounter as it was solely for the purposes of outreach and/or care coordination.

## 2019-04-23 ENCOUNTER — OFFICE VISIT (OUTPATIENT)
Dept: PSYCHIATRY | Facility: CLINIC | Age: 54
End: 2019-04-23
Attending: PSYCHIATRY & NEUROLOGY
Payer: MEDICARE

## 2019-04-23 DIAGNOSIS — F41.1 GAD (GENERALIZED ANXIETY DISORDER): ICD-10-CM

## 2019-04-23 DIAGNOSIS — F90.0 ATTENTION DEFICIT HYPERACTIVITY DISORDER (ADHD), PREDOMINANTLY INATTENTIVE TYPE: ICD-10-CM

## 2019-04-23 RX ORDER — TRAZODONE HYDROCHLORIDE 100 MG/1
TABLET ORAL
Qty: 180 TABLET | Refills: 1 | Status: SHIPPED | OUTPATIENT
Start: 2019-04-23 | End: 2019-10-08

## 2019-04-23 RX ORDER — DULOXETIN HYDROCHLORIDE 60 MG/1
120 CAPSULE, DELAYED RELEASE ORAL DAILY
Qty: 180 CAPSULE | Refills: 1 | Status: SHIPPED | OUTPATIENT
Start: 2019-04-23 | End: 2019-10-08

## 2019-04-23 RX ORDER — METHYLPHENIDATE HYDROCHLORIDE 20 MG/1
TABLET ORAL
Qty: 90 TABLET | Refills: 0 | Status: SHIPPED | OUTPATIENT
Start: 2019-04-23 | End: 2019-07-02

## 2019-04-23 NOTE — PROGRESS NOTES
Progress Notes  Psychiatry      []Hide copied text  Progress Notes   Amy Ricks MD (Physician)     Psychiatry    Patient was last seen 3-26-19        IDENTIFYING DATA:  The patient is a 52-year-old  white female, self-referred, who has returned to me for  psychiatric care.  She was a previous patient of mine several years ago and  was last followed in 2011.  At that time she was not able to make it to appointments because of transportation and she was referred back to her primary care physician for followup.        INTERIM HX::  The patient was last seen one month ago. The plan was:   Continue current medications   -- gabapentin  600 tid  --INcrease methyphenidate 20 mg bid -use this form instead of concerta due to cost  --RTC 1 mo.   -At end of visit, Taisha Falcon joined us to assist in the challenges connected with the death of her .  She was very appreciative for the help.  Finding a grief counselor is one goal and the patient agreed to a call in one week to check in .     The patient has finally found support through the [employer].  A woman at the [employer] is connecting her to a nurse who is a  and was helpful to her.  The patient was not able to get into any therapist at [facility], citing long wait list.  She also reports waiting lists for other therapists in her area as well. . The nurse sees people at her clinic in [place].     AFtermath of her 's death: Don's family has become angry and disrespectful toward her. She doesn't reply to his children anymore.  There are so many loose ends but she has an  who will help her navigate probate and the like.      Progress:  She got power of  and has been able to get insurance reinstated on the .  I congratulated her on this great progress;  She knows she is able to manage albeit taking a toll on her energy.  The increase in Ritalin has made a big difference in being able to focus and get things  "accomplished.    She is discovering aspects of her  that she didn't know: collectables all over the house, gambling, draining of his bank account because of expenditures.      Mood is low; she didn't go to her brothers on  because it was too tiring, motivation, interests are low. . She usually enjoys reading; but not able to do so; also unable to watch TV.       Last visit, the patient reports that the increase in Ritalin helped thinking clarity and was better able to remember things.  She did not forget conversations. She also reports being calmer.Also less fidgety or restless. Depression still present but not extreme. Come nighttime she doesn't want to do anything.  She sometimes has high appetite, sometimes very low. No purging but binges.  Her stomach problems include diarrhea. She has a new diet for irritable bowel. Nighttimes are problematic for eating.  Sleep is good. The last few days she has had restless legs, ghanshyam at night. Usually she takes one Klonopin and this helps but may take up to 3, which she sometimes does.  Motivation, interests: not really.  She has lots of books but loses interest. She gets outside and this makes her happy.      Social HX: her   2018.  For one year at least, he was really toxic to her.  He did not have a will so the estate will be in probate.  He was $20,000 in debt and has left her with many problems. She was totally dependent on his finances and he kept her completely in the dark.        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 Unclear if this will be full time.     MED:from previous visit:  the patient reports that the\" intestinal cells are replacing her gastric cells\" and it is precancerous. She has gastritis. She also  has had uncontrolled diarrhea for unexplained reasons;   . She has been on meds for her gastritis but she does not recall which one..  Food still gets stuck " but is somewhat better.        FH:  had stomach cancer and multiple other problems.    Fibromyalgia: lots of pain everywhere especiallly in the eves. She also was told at one time that she may have a daytime arousal problem (narcolepsy?) since she easily falls asleep during the day without warning.   This has improved with Ritalin and not happening.     She has been maintained on the following psychiatric medications :    1.  Cymbalta 120 mg daily  -it has likely helped  2.  Methylphenidate IR 20 mg in am and 20 Qnoon  This improves her mental cloudiness and mood.  It also helps energy.     4.  BuSpar 40 mg q a.m. and 30 mg q p.m.  -finds this helpful  5.  Klonopin 0.5 mg- 1.5 mg q hs for sleep. One nightly unless she gets restless legs and then she takes 3. Helps to get asleep, Usually takes 0.5 bid  6. Estradiol -  7. Levothyroxine   9. Maxalt- migraines 2-3/week and then a few weeks without any. This occurs with IBS.    10. Compazine- nausea during migraines.   11. gabapentin 600 tid prn -  12. Trazodone 200 at bedtime- helps to stay asleep.     She stopped Seroquel secondary to dry mouth.          PAST PSYCHIATRIC HISTORY:  REVIEWED PREVIOUSLY. SUMMARIZED HERE History of depression and anxiety are longstanding.  She has been on several medications.   Zoloft which helped.   Paxil also helped.  She was on Effexor which was okay but gave her a dry mouth.  There was some problem from Lexapro.  She is unsure if she was on Celexa.  Probably she was on Prozac.  Trazodone gave her a dry mouth.  She was never on Wellbutrin largely because of her history of seizures 10 years ago.  She was on Lyrica more for pain but it caused swelling and shaking involuntary movements.  Gabapentin caused neck stiffness.  Her recollection is that seroquel and lamictal were helpful for mood.       MEDICAL HISTORY:  SUMMARIZED The patient has had fibromyalgia for many years and has severely limited her activities and effected her  mood.     Her neck always has tension and she has ongoing problems with migraines. She was on oxycodone 20 mg for many years and it did help with pain, anxiety and reflux, but has been off for a few months. . Ite was discontinued because of hyperalgesia.  She is now doing physical therapy.  She has noted significant limitations in her daily function because of being off of opiates.        SOCIAL HISTORY:  As above.  her daughter had her baby and January is the zoran of her life.   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  This is the high point in her life and she is in a good mood when she is there with the baby.        REVIEW OF SYSTEMS:  Dreaming, not restful sleep, pain and fatigue.  Remainder of ten-point review of systems negative except as noted above.        MENTAL STATUS EXAMINATION:  The patient is very slender, appears her stated age. Oriented x3.  Mood is anxious but calmer than last month  , affect consistent.   Speech is regular rate and rhythm. Language intact. .  Thought processes are logical and goal directed..  Thought content is negative for suicidality and psychotic symptoms.  Thought associations are clear.  .  Fund of knowledge is good.  Insight and judgment are good, concentration fairly good. Gait steady, musculo-no deficits      ASSESSMENT:  This is a 51-year-old  white female has  major depression, BEE, panic disorder, ADHD and fibromyalgia, chronic pain a result.  Her   leaving her in a difficult financial and emotional situation. She has a high level of anxiety and depression but improved with increased dose of Ritalin         DIAGNOSES:    1. Major depressive disorder, recurrent, mod     2. Generalized anxiety disorder.     3. Panic disorder.    4. Attention deficit disorder.    5. Bulimia nervosa with episodic binge eating    6. Fibromyalgia.    7. GERD.        PLAN: Continue current medications   -- gabapentin  600 tid  --INcrease methyphenidate to 20 mg QAM and 10  mg Qnoon  -use this form instead of concerta due to cost  --RTC 1 mo.   --Meeting with nurse grief counselor this week    CHANEL GREEN MD     Psychiatry Clinic Individual Psychotherapy Note                                                                     [16]   Start time - 1210pm     End time -1226 pm  Date last reviewed -10-16-18 Date next due - 1-16-19  Subjective: This supportive psychotherapy session addressed issues related to current stressors consisting of  and his illness, pain condition, finances.  Patient's reaction: Preparatory in the context of mental status appropriate for ambulatory setting.  Progress: fair to good  Plan: RTC 1mo  Psychotherapy services during this visit included  myself and the patient.     Treatment Plan      SYMPTOMS; PROBLEMS   MEASURABLE GOALS;    FUNCTIONAL IMPROVEMENT INTERVENTIONS;   GAINS MADE DISCHARGE CRITERIA   Depression: depressed mood, anhedonia, low energy, insomnia and concentration problems   reduce depressive symptoms, find enjoyment at least once a day, reduce panic attacks/ excessive worry and reduce feeling overwhelmed/ improve decision making skills acceptance of limitations/reality  community/ family support reduced visit frequency and can transfer back to primary care   Panic Attacks: dizziness, racing heart, SOB, sweating and fear   reduce panic attacks/ excessive worry and make a plan to manage 2-3 anxiety-provoking situations acceptance of limitations/reality  building on strengths  medications , less unless  conflicts marked symptom improvement and can transfer back to primary care

## 2019-04-24 ASSESSMENT — PATIENT HEALTH QUESTIONNAIRE - PHQ9: SUM OF ALL RESPONSES TO PHQ QUESTIONS 1-9: 14

## 2019-07-01 DIAGNOSIS — F41.1 GAD (GENERALIZED ANXIETY DISORDER): ICD-10-CM

## 2019-07-01 DIAGNOSIS — F90.0 ATTENTION DEFICIT HYPERACTIVITY DISORDER (ADHD), PREDOMINANTLY INATTENTIVE TYPE: ICD-10-CM

## 2019-07-01 NOTE — TELEPHONE ENCOUNTER
[facility] Call Center    Phone Message    May a detailed message be left on voicemail: yes    Reason for Call: Medication Refill Request    Has the patient contacted the pharmacy for the refill? Yes   Name of medication being requested: Ritalin and Clonazapam  Provider who prescribed the medication:   Pharmacy: [facility] in [place]   Date medication is needed: Pt is out of Ritalin. She has 12 pills left of the clonazapam         Action Taken[facility]

## 2019-07-02 RX ORDER — METHYLPHENIDATE HYDROCHLORIDE 20 MG/1
TABLET ORAL
Qty: 90 TABLET | Refills: 0 | Status: SHIPPED | OUTPATIENT
Start: 2019-07-02 | End: 2019-08-15

## 2019-07-02 RX ORDER — CLONAZEPAM 0.5 MG/1
1.5 TABLET ORAL
Qty: 90 TABLET | Refills: 0 | Status: SHIPPED | OUTPATIENT
Start: 2019-07-02 | End: 2019-10-08

## 2019-07-02 NOTE — TELEPHONE ENCOUNTER
Refill Request (Ritalin and Klonopin )      Quoc Victoria MD  You 7 minutes ago (11:03 AM)      Sorry.  40/20    Routing comment        You  Quoc Victoria MD 24 minutes ago (10:47 AM)      Hi Dr. Victoria,     She was provided with 40 am and 20 pm. Is that the dose you want me to refill or the 20 in the am and 10 noon. Please confirm.     Thanks,   Latisha GILMORE     Routing comment        Quoc Victoria MD  You 36 minutes ago (10:35 AM)      Dose is correct at 20/10 AM and PM    Routing comment      Script printed for both meds and placed in providers folder to sign

## 2019-07-02 NOTE — TELEPHONE ENCOUNTER
"Last seen: 4/23/19  RTC: 1 month   Cancel: none   No-show: 5/21/19  Next appt: 8/20/19    Incoming refill from patient via phone     Medication requested: methylphenidate (RITALIN) 20 MG tablet  Directions: Take 2 tabs in am and one tab at noon.  Qty: 90  Last refilled: 5/21/19, 4/16/19, 3/19/19    Medication requested: clonazePAM (KLONOPIN) 0.5 MG tablet  Directions: Take 3 tablets (1.5 mg) by mouth nightly as needed for anxiety - Oral  Qty: 90  Last refilled: 4/16/19, 3/19/19, 1/14/19    Post reviewing the chart, dose discrepancy on Ritalin. Per med tab \" INcrease methyphenidate to 20 mg QAM and 10 mg Qnoon  -use this form instead of concerta due to cost.\" Per med tab was prescribed Ritalin 20 mg tabs - Take 2 tabs in am and one tab at noon. Will reach out to the patient and provider to confirm the dose.     Writer placed a call to patient at [phone] to gather additional details on the dosing of the Ritalin. No answer at number provided. LVM, requesting a call back. Clinic number provided.   "

## 2019-07-03 NOTE — TELEPHONE ENCOUNTER
Writer received signed script from provider for Ritalin and Klonopin.  Script mailed out to [facility] pharmacy at [id number] - [place], [place] - [address]   Patient notified of the refill     No further action needed by this writer

## 2019-08-15 ENCOUNTER — TELEPHONE (OUTPATIENT)
Dept: PSYCHIATRY | Facility: CLINIC | Age: 54
End: 2019-08-15

## 2019-08-15 DIAGNOSIS — F90.0 ATTENTION DEFICIT HYPERACTIVITY DISORDER (ADHD), PREDOMINANTLY INATTENTIVE TYPE: ICD-10-CM

## 2019-08-15 RX ORDER — METHYLPHENIDATE HYDROCHLORIDE 20 MG/1
TABLET ORAL
Qty: 90 TABLET | Refills: 0 | Status: SHIPPED | OUTPATIENT
Start: 2019-08-15 | End: 2019-10-08

## 2019-08-15 NOTE — TELEPHONE ENCOUNTER
Writer reached out to provider and received VORB   1. methylphenidate (RITALIN) 20 MG tablet- Take 2 tabs in am and one tab at noon #90 with 0 refills     Meds refilled per providers approval   Med tab changed to reflect this   Writer received signed script from provider  Placed a call to patient at [phone] and confirmed the request to mail the script out to [id number] [address] [place] [address]. Script mailed out per request.     No further action needed by this writer

## 2019-08-15 NOTE — TELEPHONE ENCOUNTER
Message   Received: Today   Message Contents   Latisha Frausto, RN  Latisha Frausto, RN   Phone Number: [phone]          Previous Messages      ----- Message -----   From: Cecy Menezes   Sent: 8/15/2019  12:00 PM   To: [facility] [place]     Caller:  self   Relationship to pt: self   Medication:   ritilin   How many days left of med do you have left (if >3 day supply, redirect to pharmacy): 2   [facility] and [facility]   Pending appt date:  9/24   Okay to leave detailed VM:  [phone]      Last seen: 4/23/19  RTC: 1 month   Cancel: 8/20/19  No-show: 5/21/19  Next appt: 9/24/19    Incoming refill from patient via phone     Medication requested: methylphenidate (RITALIN) 20 MG tablet  Directions: Take 2 tabs in am and one tab at noon  Qty: 90  Last refilled: 7/15/19, 5/21/19, 4/16/19    Will route to provider for approval

## 2019-10-08 ENCOUNTER — OFFICE VISIT (OUTPATIENT)
Dept: PSYCHIATRY | Facility: CLINIC | Age: 54
End: 2019-10-08
Attending: PSYCHIATRY & NEUROLOGY
Payer: COMMERCIAL

## 2019-10-08 DIAGNOSIS — F41.1 GAD (GENERALIZED ANXIETY DISORDER): ICD-10-CM

## 2019-10-08 DIAGNOSIS — F90.0 ATTENTION DEFICIT HYPERACTIVITY DISORDER (ADHD), PREDOMINANTLY INATTENTIVE TYPE: ICD-10-CM

## 2019-10-08 RX ORDER — BUSPIRONE HYDROCHLORIDE 10 MG/1
TABLET ORAL
Qty: 630 TABLET | Refills: 1 | Status: SHIPPED | OUTPATIENT
Start: 2019-10-08 | End: 2020-02-04

## 2019-10-08 RX ORDER — GABAPENTIN 300 MG/1
CAPSULE ORAL
Qty: 270 CAPSULE | Refills: 1 | Status: SHIPPED | OUTPATIENT
Start: 2019-10-08 | End: 2020-02-04

## 2019-10-08 RX ORDER — MIRTAZAPINE 15 MG/1
15 TABLET, FILM COATED ORAL AT BEDTIME
Qty: 30 TABLET | Refills: 1 | Status: SHIPPED | OUTPATIENT
Start: 2019-10-08 | End: 2019-11-12

## 2019-10-08 RX ORDER — DULOXETIN HYDROCHLORIDE 60 MG/1
120 CAPSULE, DELAYED RELEASE ORAL DAILY
Qty: 180 CAPSULE | Refills: 1 | Status: SHIPPED | OUTPATIENT
Start: 2019-10-08 | End: 2020-02-04

## 2019-10-08 RX ORDER — METHYLPHENIDATE HYDROCHLORIDE 20 MG/1
TABLET ORAL
Qty: 90 TABLET | Refills: 0 | Status: SHIPPED | OUTPATIENT
Start: 2019-10-08 | End: 2019-11-12

## 2019-10-08 RX ORDER — TRAZODONE HYDROCHLORIDE 100 MG/1
TABLET ORAL
Qty: 180 TABLET | Refills: 1 | Status: SHIPPED | OUTPATIENT
Start: 2019-10-08 | End: 2020-02-04

## 2019-10-08 RX ORDER — CLONAZEPAM 0.5 MG/1
1.5 TABLET ORAL
Qty: 90 TABLET | Refills: 0 | Status: SHIPPED | OUTPATIENT
Start: 2019-10-08 | End: 2019-11-12

## 2019-10-08 NOTE — PATIENT INSTRUCTIONS
Phone number for outpatient [facility] hospital or day treatment program [phone]    Thank you for coming to the PSYCHIATRY CLINIC.    Lab Testing:  If you had lab testing today and your results are reassuring or normal they will be mailed to you or sent through [employer] within 7 days.   If the lab tests need quick action we will call you with the results.  The phone number we will call with results is # [phone] (home) . If this is not the best number please call our clinic and change the number.    Medication Refills:  If you need any refills please call your pharmacy and they will contact us. Our fax number for refills is [phone]. Please allow three business for refill processing.   If you need to  your refill at a new pharmacy, please contact the new pharmacy directly. The new pharmacy will help you get your medications transferred.     Scheduling:  If you have any concerns about today's visit or wish to schedule another appointment please call our office during normal business hours [phone] (8-5:00 M-F)    Contact Us:  Please call [phone] during business hours (8-5:00 M-F).  If after clinic hours, or on the weekend, please call  [phone].    Financial Assistance [phone]  [facility] Billing [phone]  [place] Billing [facility], [facility]: [phone]  [place] Billing [phone]  Medical Records [phone]      MENTAL HEALTH CRISIS NUMBERS:  [facility]:   [facility] - [phone]   Crisis Residence [facility] - [phone]   Walk[facility] [phone]   COPE 24/7 [place] Mobile Team for Adults - [[phone]]; Child - [[phone]]        [facility]:   [facility] - [phone]   Walk-in counseling [facility] - [phone]   Walk-in counseling [facility] - [phone]   Crisis Residence [facility] - [phone]   Urgent Care Adult Mental  Health:   --Drop-in, 24/7 crisis line, and [facility] Mobile Team [[phone]]    CRISIS TEXT LINE: Text [phone] from anywhere, anytime, any crisis 24/7;    OR SEE [email or web address]     Poison Control Center - [phone]    CHILD: [facility] needs assessment team - [phone]     [facility] Lifeline - [phone]; or [facility] Lifeline - [phone]    If you have a medical emergency please call 911or go to the nearest ER.                    _____________________________________________    Again thank you for choosing PSYCHIATRY CLINIC and please let us know how we can best partner with you to improve you and your family's health.  You may be receiving a survey in the mail regarding this appointment. We would love to have your feedback, both positive and negative, so please fill out the survey and return it using the provided envelope. The survey is done by an external company, so your answers are anonymous.
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
## 2019-10-14 ASSESSMENT — PATIENT HEALTH QUESTIONNAIRE - PHQ9: SUM OF ALL RESPONSES TO PHQ QUESTIONS 1-9: 18

## 2019-11-12 ENCOUNTER — OFFICE VISIT (OUTPATIENT)
Dept: PSYCHIATRY | Facility: CLINIC | Age: 54
End: 2019-11-12
Attending: PSYCHIATRY & NEUROLOGY
Payer: COMMERCIAL

## 2019-11-12 DIAGNOSIS — F41.1 GAD (GENERALIZED ANXIETY DISORDER): ICD-10-CM

## 2019-11-12 DIAGNOSIS — F90.0 ATTENTION DEFICIT HYPERACTIVITY DISORDER (ADHD), PREDOMINANTLY INATTENTIVE TYPE: ICD-10-CM

## 2019-11-12 RX ORDER — METHYLPHENIDATE HYDROCHLORIDE 20 MG/1
TABLET ORAL
Qty: 90 TABLET | Refills: 0 | Status: SHIPPED | OUTPATIENT
Start: 2019-11-12 | End: 2020-01-28

## 2019-11-12 RX ORDER — CLONAZEPAM 0.5 MG/1
1.5 TABLET ORAL
Qty: 90 TABLET | Refills: 3 | Status: SHIPPED | OUTPATIENT
Start: 2019-11-12 | End: 2020-04-07

## 2019-11-12 RX ORDER — MIRTAZAPINE 30 MG/1
30 TABLET, FILM COATED ORAL AT BEDTIME
Qty: 30 TABLET | Refills: 1 | Status: SHIPPED | OUTPATIENT
Start: 2019-11-12 | End: 2020-02-03

## 2019-11-12 RX ORDER — METHYLPHENIDATE HYDROCHLORIDE 20 MG/1
TABLET ORAL
Qty: 90 TABLET | Refills: 0 | Status: SHIPPED | OUTPATIENT
Start: 2019-11-12 | End: 2019-11-12

## 2019-11-12 ASSESSMENT — PATIENT HEALTH QUESTIONNAIRE - PHQ9: SUM OF ALL RESPONSES TO PHQ QUESTIONS 1-9: 11

## 2019-11-12 NOTE — PATIENT INSTRUCTIONS
Thank you for coming to the PSYCHIATRY CLINIC.    Lab Testing:  If you had lab testing today and your results are reassuring or normal they will be mailed to you or sent through [employer] within 7 days.   If the lab tests need quick action we will call you with the results.  The phone number we will call with results is # [phone] (home) . If this is not the best number please call our clinic and change the number.    Medication Refills:  If you need any refills please call your pharmacy and they will contact us. Our fax number for refills is [phone]. Please allow three business for refill processing.   If you need to  your refill at a new pharmacy, please contact the new pharmacy directly. The new pharmacy will help you get your medications transferred.     Scheduling:  If you have any concerns about today's visit or wish to schedule another appointment please call our office during normal business hours [phone] (8-5:00 M-F)    Contact Us:  Please call [phone] during business hours (8-5:00 M-F).  If after clinic hours, or on the weekend, please call  [phone].    Financial Assistance [phone]  [facility] Billing [phone]  [facility], [facility]: [phone]  [place] Billing [phone]  Medical Records [phone]      MENTAL HEALTH CRISIS NUMBERS:  [facility]:   [facility] - [phone]   Crisis Residence [facility] - [phone]   Walk-In Counseling [facility] [phone]   COPE 24/7 [place] Mobile Team for Adults - [[phone]]; Child - [[phone]]        [facility]:   [facility] - [phone]   Walk-in counseling [facility] - [phone]   Walk-in counseling [facility] - [phone]   Crisis Residence [facility] - [phone]   Urgent Care Adult Mental Health:   --Drop-in, 24/7 crisis line, and [place] Mobile Team  [[phone]]    CRISIS TEXT LINE: Text [phone] from anywhere, anytime, any crisis 24/7;    OR SEE [email or web address]     Poison Control Center - [phone]    CHILD: [facility] needs assessment team - [phone]     [facility] - [phone]; or [facility] Lifeline - [phone]    If you have a medical emergency please call 911or go to the nearest ER.                    _____________________________________________    Again thank you for choosing PSYCHIATRY CLINIC and please let us know how we can best partner with you to improve you and your family's health.  You may be receiving a survey in the mail regarding this appointment. We would love to have your feedback, both positive and negative, so please fill out the survey and return it using the provided envelope. The survey is done by an external company, so your answers are anonymous.
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
## 2020-01-28 DIAGNOSIS — F90.0 ATTENTION DEFICIT HYPERACTIVITY DISORDER (ADHD), PREDOMINANTLY INATTENTIVE TYPE: ICD-10-CM

## 2020-01-28 RX ORDER — METHYLPHENIDATE HYDROCHLORIDE 20 MG/1
TABLET ORAL
Qty: 90 TABLET | Refills: 0 | Status: SHIPPED | OUTPATIENT
Start: 2020-01-28 | End: 2020-02-04

## 2020-01-28 NOTE — TELEPHONE ENCOUNTER
[facility] Call Center    Phone Message    May a detailed message be left on voicemail: yes    Reason for Call: Medication Refill Request    Has the patient contacted the pharmacy for the refill? Yes   Name of medication being requested: Ritalin 20 mg  Provider who prescribed the medication: Dr. Ricks  Pharmacy: [phone]  Date medication is needed: ASAP         Action Taken: Message routed to:  Other: Nursing Staff

## 2020-01-28 NOTE — TELEPHONE ENCOUNTER
Last seen: 11/12/19  RTC: 6 weeks   Cancel: none   No-show: 1/14/20  Next appt: none     Incoming refill from patient via phone     Medication requested: methylphenidate (RITALIN) 20 MG tablet  Directions: Take 2 tabs in am and one tab at noon  Qty: 90  Last refilled: 12/14/19 #90, 10/19 #90, 8/22 #90    Will route to provider for approval due to no show on appt

## 2020-01-29 NOTE — TELEPHONE ENCOUNTER
Refill Request (methylphenidate (RITALIN) 20 MG tablet)      Amy Ricks MD  You 10 hours ago (10:23 PM)      Yes, I did it but she has to get an appt.  Unfortunately, not showing was a pattern in the past and resulted in me not prescribing and referring her back to her PCP so I don't want to have to do that again.     ss    Routing comment      Meds refilled by provider   Med tab changed to reflect this     Writer placed a call to patient at [phone] to relay the above information. No answer at number provided. LVM, notifying to call back and schedule an appt for further refills. Clinic number provided for a call back.

## 2020-01-31 NOTE — TELEPHONE ENCOUNTER
Cecy Menezes Radhika, RN 3 hours ago (11:17 AM)      Go pt on phone, she's scheduled for Tuesday 2/4     Pt always wanted us to be aware that if we have not already, all of her meds from us should be transferred to the [facility] in [place]    Routing comment          Writer called pt, as Ritalin Rx went to [facility] pharmacy in [place]. The pt said she received a letter from her insurance stating prescriptions from [facility] will be more expensive than [facility]. Still, she would like to try filling there, as the prescription cannot be transferred. If she's unable to fill, pt willing to wait until her visit on 2/4 for a refill. Pt's chart adjusted to show preferred pharmacy as [facility] in [place].

## 2020-02-03 DIAGNOSIS — F41.1 GAD (GENERALIZED ANXIETY DISORDER): ICD-10-CM

## 2020-02-03 RX ORDER — MIRTAZAPINE 30 MG/1
30 TABLET, FILM COATED ORAL AT BEDTIME
Qty: 30 TABLET | Refills: 1 | Status: SHIPPED | OUTPATIENT
Start: 2020-02-03 | End: 2020-02-04

## 2020-02-03 NOTE — TELEPHONE ENCOUNTER
Last seen: 11/12/19  RTC: 6 weeks   Cancel: none   No-show: 1/14/20  Next appt: 2/4/20    Incoming refill from patient via phone     Medication requested: mirtazapine (REMERON) 30 MG tablet  Directions: : Take 1 tablet (30 mg) by mouth At Bedtime - Oral  Qty: 30  Last refilled: 11/12/19    Medication requested: busPIRone (BUSPAR) 10 MG tablet  Directions: Take 4 in am and 3 at night.  Qty: 630  Last refilled: 10/8/20  - Has one refill on file for a 3 month supply     Will route to provider for approval due to no show on appt

## 2020-02-03 NOTE — TELEPHONE ENCOUNTER
[facility] Call Center    Phone Message    May a detailed message be left on voicemail: yes    Reason for Call: Medication Refill Request    Has the patient contacted the pharmacy for the refill? Yes   Name of medication being requested: Mirtazapine and Buspar  Provider who prescribed the medication: Amy Ricks  Pharmacy: [facility] [place]  Date medication is needed: ASAP, patient is out of medication         Action Taken: Message routed to:  Other: P [facility] PS[facility]CHIATRY [facility]

## 2020-02-04 ENCOUNTER — OFFICE VISIT (OUTPATIENT)
Dept: PSYCHIATRY | Facility: CLINIC | Age: 55
End: 2020-02-04
Attending: PSYCHIATRY & NEUROLOGY
Payer: COMMERCIAL

## 2020-02-04 DIAGNOSIS — F41.1 GAD (GENERALIZED ANXIETY DISORDER): ICD-10-CM

## 2020-02-04 DIAGNOSIS — F90.0 ATTENTION DEFICIT HYPERACTIVITY DISORDER (ADHD), PREDOMINANTLY INATTENTIVE TYPE: ICD-10-CM

## 2020-02-04 RX ORDER — DULOXETIN HYDROCHLORIDE 60 MG/1
120 CAPSULE, DELAYED RELEASE ORAL DAILY
Qty: 180 CAPSULE | Refills: 1 | Status: SHIPPED | OUTPATIENT
Start: 2020-02-04 | End: 2020-07-21

## 2020-02-04 RX ORDER — TRAZODONE HYDROCHLORIDE 100 MG/1
TABLET ORAL
Qty: 180 TABLET | Refills: 1 | Status: SHIPPED | OUTPATIENT
Start: 2020-02-04 | End: 2020-04-07

## 2020-02-04 RX ORDER — METHYLPHENIDATE HYDROCHLORIDE 20 MG/1
TABLET ORAL
Qty: 90 TABLET | Refills: 0 | Status: SHIPPED | OUTPATIENT
Start: 2020-02-04 | End: 2020-03-16

## 2020-02-04 RX ORDER — MIRTAZAPINE 30 MG/1
30 TABLET, FILM COATED ORAL AT BEDTIME
Qty: 90 TABLET | Refills: 1 | Status: SHIPPED | OUTPATIENT
Start: 2020-02-04 | End: 2020-07-21

## 2020-02-04 RX ORDER — TRAZODONE HYDROCHLORIDE 100 MG/1
TABLET ORAL
Qty: 180 TABLET | Refills: 1 | Status: CANCELLED | OUTPATIENT
Start: 2020-02-04

## 2020-02-04 RX ORDER — BUSPIRONE HYDROCHLORIDE 10 MG/1
TABLET ORAL
Qty: 630 TABLET | Refills: 1 | Status: CANCELLED | OUTPATIENT
Start: 2020-02-04

## 2020-02-04 RX ORDER — BUSPIRONE HYDROCHLORIDE 10 MG/1
TABLET ORAL
Qty: 630 TABLET | Refills: 1 | Status: SHIPPED | OUTPATIENT
Start: 2020-02-04 | End: 2020-07-21

## 2020-02-04 RX ORDER — GABAPENTIN 300 MG/1
CAPSULE ORAL
Qty: 270 CAPSULE | Refills: 1 | Status: SHIPPED | OUTPATIENT
Start: 2020-02-04 | End: 2020-07-21

## 2020-02-04 NOTE — TELEPHONE ENCOUNTER
Meds refilled by provider   Med tab changed to reflect this   Patient notified and is requesting a refill for Trazodone and Buspar     Last seen: 11/12/19  RTC: 6 weeks   Cancel: none   No-show: 1/14/20  Next appt: 2/4/20    Incoming refill from patient via phone     Medication requested: busPIRone (BUSPAR) 10 MG tablet  Directions: Take 4 in am and 3 at night.  Qty: 630  Last refilled: 10/8/19    Medication requested: traZODone (DESYREL) 100 MG tablet  Directions: 2-3 tablets QHS prn sleep  Qty: 180  Last refilled: 10/8/19    Per chart review, patient should have one refill on file at the pharmacy for both meds. Placed a call to [phone] and spoke with pharmacistTrenton who informed this writer refills were deactivated. He is unable to share the reason for deactivation. Writer agreed to route to provider for approval.
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
## 2020-02-04 NOTE — TELEPHONE ENCOUNTER
Meds refilled by provider during the office visit on 2/4/20     No further action needed by this writer

## 2020-03-16 DIAGNOSIS — F90.0 ATTENTION DEFICIT HYPERACTIVITY DISORDER (ADHD), PREDOMINANTLY INATTENTIVE TYPE: ICD-10-CM

## 2020-03-16 NOTE — TELEPHONE ENCOUNTER
Last seen: 2/4/20  RTC: 6 weeks   Cancel: none   No-show: none   Next appt: 4/7/20    Incoming refill from patient via phone     Medication requested: methylphenidate (RITALIN) 20 MG tablet  Directions: Take 2 tabs in am and one tab at noon.   Qty: 90  Last refilled: 2/4/20    Will route to provider to e-prescribe

## 2020-03-16 NOTE — TELEPHONE ENCOUNTER
[facility] Call Center    Phone Message    May a detailed message be left on voicemail: yes     Reason for Call: Medication Refill Request    Has the patient contacted the pharmacy for the refill? Yes   Name of medication being requested: Ritalin  Provider who prescribed the medication: Amy Ricks  Pharmacy: [facility] [id number] [place], [place] - [address]    Date medication is needed: ASAP - patient is out of medication        Action Taken: Message routed to:  Other: Psychiatry Nurse Pool    Travel Screening: Not Applicable

## 2020-03-18 RX ORDER — METHYLPHENIDATE HYDROCHLORIDE 20 MG/1
TABLET ORAL
Qty: 90 TABLET | Refills: 0 | Status: SHIPPED | OUTPATIENT
Start: 2020-03-18 | End: 2020-04-07

## 2020-03-30 DIAGNOSIS — F41.1 GAD (GENERALIZED ANXIETY DISORDER): ICD-10-CM

## 2020-03-31 RX ORDER — TRAZODONE HYDROCHLORIDE 100 MG/1
TABLET ORAL
Qty: 180 TABLET | Refills: 1 | OUTPATIENT
Start: 2020-03-31

## 2020-04-07 ENCOUNTER — VIRTUAL VISIT (OUTPATIENT)
Dept: PSYCHIATRY | Facility: CLINIC | Age: 55
End: 2020-04-07
Attending: PSYCHIATRY & NEUROLOGY
Payer: COMMERCIAL

## 2020-04-07 DIAGNOSIS — F41.1 GAD (GENERALIZED ANXIETY DISORDER): ICD-10-CM

## 2020-04-07 DIAGNOSIS — F90.0 ATTENTION DEFICIT HYPERACTIVITY DISORDER (ADHD), PREDOMINANTLY INATTENTIVE TYPE: ICD-10-CM

## 2020-04-07 RX ORDER — CLONAZEPAM 0.5 MG/1
1.5 TABLET ORAL
Qty: 90 TABLET | Refills: 3 | Status: SHIPPED | OUTPATIENT
Start: 2020-04-07 | End: 2020-10-20

## 2020-04-07 RX ORDER — METHYLPHENIDATE HYDROCHLORIDE 20 MG/1
TABLET ORAL
Qty: 90 TABLET | Refills: 0 | Status: SHIPPED | OUTPATIENT
Start: 2020-04-07 | End: 2020-07-07

## 2020-04-07 RX ORDER — TRAZODONE HYDROCHLORIDE 100 MG/1
TABLET ORAL
Qty: 270 TABLET | Refills: 1 | Status: SHIPPED | OUTPATIENT
Start: 2020-04-07 | End: 2020-07-21
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
## 2020-07-07 DIAGNOSIS — F90.0 ATTENTION DEFICIT HYPERACTIVITY DISORDER (ADHD), PREDOMINANTLY INATTENTIVE TYPE: ICD-10-CM

## 2020-07-07 RX ORDER — METHYLPHENIDATE HYDROCHLORIDE 20 MG/1
TABLET ORAL
Qty: 90 TABLET | Refills: 0 | Status: SHIPPED | OUTPATIENT
Start: 2020-07-07 | End: 2020-07-21

## 2020-07-21 ENCOUNTER — VIRTUAL VISIT (OUTPATIENT)
Dept: PSYCHIATRY | Facility: CLINIC | Age: 55
End: 2020-07-21
Attending: PSYCHIATRY & NEUROLOGY
Payer: COMMERCIAL

## 2020-07-21 DIAGNOSIS — F90.0 ATTENTION DEFICIT HYPERACTIVITY DISORDER (ADHD), PREDOMINANTLY INATTENTIVE TYPE: ICD-10-CM

## 2020-07-21 DIAGNOSIS — F41.1 GAD (GENERALIZED ANXIETY DISORDER): ICD-10-CM

## 2020-07-21 RX ORDER — GABAPENTIN 300 MG/1
CAPSULE ORAL
Qty: 270 CAPSULE | Refills: 1 | Status: SHIPPED | OUTPATIENT
Start: 2020-07-21 | End: 2020-10-20

## 2020-07-21 RX ORDER — BUSPIRONE HYDROCHLORIDE 10 MG/1
TABLET ORAL
Qty: 630 TABLET | Refills: 1 | Status: SHIPPED | OUTPATIENT
Start: 2020-07-21 | End: 2021-01-12

## 2020-07-21 RX ORDER — TRAZODONE HYDROCHLORIDE 100 MG/1
TABLET ORAL
Qty: 270 TABLET | Refills: 1 | Status: SHIPPED | OUTPATIENT
Start: 2020-07-21 | End: 2021-01-12

## 2020-07-21 RX ORDER — METHYLPHENIDATE HYDROCHLORIDE 20 MG/1
TABLET ORAL
Qty: 90 TABLET | Refills: 0 | Status: SHIPPED | OUTPATIENT
Start: 2020-07-21 | End: 2020-08-25

## 2020-07-21 RX ORDER — DULOXETIN HYDROCHLORIDE 60 MG/1
120 CAPSULE, DELAYED RELEASE ORAL DAILY
Qty: 180 CAPSULE | Refills: 1 | Status: SHIPPED | OUTPATIENT
Start: 2020-07-21 | End: 2021-01-12

## 2020-07-21 RX ORDER — MIRTAZAPINE 45 MG/1
45 TABLET, FILM COATED ORAL AT BEDTIME
Qty: 30 TABLET | Refills: 1 | Status: SHIPPED | OUTPATIENT
Start: 2020-07-21 | End: 2020-10-20
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
## 2020-07-31 ENCOUNTER — TELEPHONE (OUTPATIENT)
Dept: PSYCHIATRY | Facility: CLINIC | Age: 55
End: 2020-07-31

## 2020-07-31 NOTE — TELEPHONE ENCOUNTER
SW left message offering support and help connecting to resources. Left contact number and encouraged Rossi to return call.    Referral information: You have talked to her before to help with finding therapy in her area of [place].  [facility] and she couldn't connect with a therapist: availability,, etc.  She is completely overwhelmed and needs help with basic problem solving.    one year ago leaving her with house but no financial means (hasn't been able to work in years due to mental health/physical health) and the step kids are taking advantage of her.   Can you or Natalie reach out to her to help problem solve the next step?   She needs to connect with therapist, help prioritizing tasks.  She has difficulty processing and ADhd has been problematic, though I have her on stimulants and several other meds. She knows someone will be reaching out to her and is grateful for any help.     FRANCO will follow up in 3-5 business days.    PAWAN Obando, [facility]  [phone]

## 2020-08-13 ENCOUNTER — TELEPHONE (OUTPATIENT)
Dept: PSYCHIATRY | Facility: CLINIC | Age: 55
End: 2020-08-13

## 2020-08-13 NOTE — TELEPHONE ENCOUNTER
SW left second message offering support in finding a therapist and providing support with organization/making decisions. Provided contact number.    Will route to provider for FYI and to assist with coordination.    PAWAN Obando, LICSW  [phone]

## 2020-08-25 ENCOUNTER — VIRTUAL VISIT (OUTPATIENT)
Dept: PSYCHIATRY | Facility: CLINIC | Age: 55
End: 2020-08-25
Attending: PSYCHIATRY & NEUROLOGY
Payer: COMMERCIAL

## 2020-08-25 DIAGNOSIS — F90.0 ATTENTION DEFICIT HYPERACTIVITY DISORDER (ADHD), PREDOMINANTLY INATTENTIVE TYPE: ICD-10-CM

## 2020-08-25 RX ORDER — METHYLPHENIDATE HYDROCHLORIDE 20 MG/1
TABLET ORAL
Qty: 90 TABLET | Refills: 0 | Status: SHIPPED | OUTPATIENT
Start: 2020-08-25 | End: 2020-10-20

## 2020-08-25 RX ORDER — METHYLPHENIDATE HYDROCHLORIDE 20 MG/1
TABLET ORAL
Qty: 90 TABLET | Refills: 0 | Status: SHIPPED | OUTPATIENT
Start: 2020-08-25 | End: 2020-08-25
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
## 2020-11-24 ENCOUNTER — VIRTUAL VISIT (OUTPATIENT)
Dept: PSYCHIATRY | Facility: CLINIC | Age: 55
End: 2020-11-24
Attending: PSYCHIATRY & NEUROLOGY
Payer: COMMERCIAL

## 2020-11-24 DIAGNOSIS — F90.0 ATTENTION DEFICIT HYPERACTIVITY DISORDER (ADHD), PREDOMINANTLY INATTENTIVE TYPE: ICD-10-CM

## 2020-11-24 PROCEDURE — 90833 PSYTX W PT W E/M 30 MIN: CPT | Mod: 95 | Performed by: PSYCHIATRY & NEUROLOGY

## 2020-11-24 PROCEDURE — 99214 OFFICE O/P EST MOD 30 MIN: CPT | Mod: 95 | Performed by: PSYCHIATRY & NEUROLOGY

## 2020-11-24 RX ORDER — METHYLPHENIDATE HYDROCHLORIDE 20 MG/1
TABLET ORAL
Qty: 90 TABLET | Refills: 0 | Status: SHIPPED | OUTPATIENT
Start: 2020-11-24 | End: 2020-11-24

## 2020-11-24 RX ORDER — METHYLPHENIDATE HYDROCHLORIDE 20 MG/1
TABLET ORAL
Qty: 90 TABLET | Refills: 0 | Status: SHIPPED | OUTPATIENT
Start: 2020-11-24 | End: 2021-01-12
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
## 2021-01-12 ENCOUNTER — VIRTUAL VISIT (OUTPATIENT)
Dept: PSYCHIATRY | Facility: CLINIC | Age: 56
End: 2021-01-12
Attending: PSYCHIATRY & NEUROLOGY
Payer: COMMERCIAL

## 2021-01-12 DIAGNOSIS — F41.1 GAD (GENERALIZED ANXIETY DISORDER): ICD-10-CM

## 2021-01-12 DIAGNOSIS — F90.0 ATTENTION DEFICIT HYPERACTIVITY DISORDER (ADHD), PREDOMINANTLY INATTENTIVE TYPE: ICD-10-CM

## 2021-01-12 PROCEDURE — 99214 OFFICE O/P EST MOD 30 MIN: CPT | Mod: 95 | Performed by: PSYCHIATRY & NEUROLOGY

## 2021-01-12 PROCEDURE — 90833 PSYTX W PT W E/M 30 MIN: CPT | Mod: 95 | Performed by: PSYCHIATRY & NEUROLOGY

## 2021-01-12 RX ORDER — BUSPIRONE HYDROCHLORIDE 10 MG/1
TABLET ORAL
Qty: 630 TABLET | Refills: 1 | Status: SHIPPED | OUTPATIENT
Start: 2021-01-12 | End: 2021-08-31

## 2021-01-12 RX ORDER — METHYLPHENIDATE HYDROCHLORIDE 20 MG/1
TABLET ORAL
Qty: 90 TABLET | Refills: 0 | Status: SHIPPED | OUTPATIENT
Start: 2021-01-12 | End: 2021-02-02

## 2021-01-12 RX ORDER — METHYLPHENIDATE HYDROCHLORIDE 20 MG/1
TABLET ORAL
Qty: 90 TABLET | Refills: 0 | Status: SHIPPED | OUTPATIENT
Start: 2021-01-12 | End: 2021-01-12

## 2021-01-12 RX ORDER — DULOXETIN HYDROCHLORIDE 60 MG/1
120 CAPSULE, DELAYED RELEASE ORAL DAILY
Qty: 180 CAPSULE | Refills: 1 | Status: SHIPPED | OUTPATIENT
Start: 2021-01-12 | End: 2021-07-30

## 2021-01-12 RX ORDER — TRAZODONE HYDROCHLORIDE 100 MG/1
TABLET ORAL
Qty: 270 TABLET | Refills: 1 | Status: SHIPPED | OUTPATIENT
Start: 2021-01-12 | End: 2021-08-06
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
## 2021-02-02 DIAGNOSIS — F41.1 GAD (GENERALIZED ANXIETY DISORDER): ICD-10-CM

## 2021-02-02 DIAGNOSIS — F90.0 ATTENTION DEFICIT HYPERACTIVITY DISORDER (ADHD), PREDOMINANTLY INATTENTIVE TYPE: ICD-10-CM

## 2021-02-02 RX ORDER — METHYLPHENIDATE HYDROCHLORIDE 20 MG/1
TABLET ORAL
Qty: 90 TABLET | Refills: 0 | Status: SHIPPED | OUTPATIENT
Start: 2021-02-02 | End: 2021-03-17

## 2021-02-02 RX ORDER — METHYLPHENIDATE HYDROCHLORIDE 20 MG/1
TABLET ORAL
Qty: 90 TABLET | Refills: 0 | Status: SHIPPED | OUTPATIENT
Start: 2021-02-02 | End: 2021-02-02

## 2021-03-17 DIAGNOSIS — F90.0 ATTENTION DEFICIT HYPERACTIVITY DISORDER (ADHD), PREDOMINANTLY INATTENTIVE TYPE: ICD-10-CM

## 2021-03-17 RX ORDER — METHYLPHENIDATE HYDROCHLORIDE 20 MG/1
TABLET ORAL
Qty: 90 TABLET | Refills: 0 | Status: SHIPPED | OUTPATIENT
Start: 2021-03-17 | End: 2021-04-27

## 2021-03-17 NOTE — TELEPHONE ENCOUNTER
[facility] Call Center    Phone Message    May a detailed message be left on voicemail: yes     Reason for Call: Medication Refill Request    Has the patient contacted the pharmacy for the refill? Yes   Name of medication being requested: ritalin  Provider who prescribed the medication: Amy Ricks MD  Pharmacy:  [facility] [id number] [place], [place] - [address]  Date medication is needed: ASAP - pt is out of medication        Action Taken: Message routed to:  Other: Nurse pool    Travel Screening: Not Applicable

## 2021-03-17 NOTE — TELEPHONE ENCOUNTER
Last seen: 1/12  RTC: 8 weeks   Cancel: none   No-show: none   Next appt: none     Incoming refill from patient via phone     Medication requested: methylphenidate (RITALIN) 20 MG tablet  Directions: Take 2 tabs in am and one tab at noon.  Qty: 90  Last refilled: 2/2 #90, 11/24 #90, 10/20 #90    Will route to provider for approval

## 2021-04-02 ENCOUNTER — TELEPHONE (OUTPATIENT)
Dept: PSYCHIATRY | Facility: CLINIC | Age: 56
End: 2021-04-02

## 2021-04-02 NOTE — TELEPHONE ENCOUNTER
FRANCO left messages for Rossi on both her home and cell phones. Writer requested a call back and provided contact information.    PAWAN Obando, [facility]  [phone]        ----- Message from Amy Ricks MD sent at 3/30/2021  3:53 PM CDT -----  Galina,  You have spoken with this patient of mine before to help her find a therapist.  She didn't connect with one but she is now willing to try again.  She asked to talk to you.  Her cell is [phone].  Thanks,  Amy
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
## 2021-04-19 DIAGNOSIS — F41.1 GAD (GENERALIZED ANXIETY DISORDER): ICD-10-CM

## 2021-04-20 RX ORDER — MIRTAZAPINE 45 MG/1
45 TABLET, FILM COATED ORAL AT BEDTIME
Qty: 30 TABLET | Refills: 0 | Status: SHIPPED | OUTPATIENT
Start: 2021-04-20 | End: 2021-04-27

## 2021-04-20 NOTE — TELEPHONE ENCOUNTER
Medication requested: mirtazapine (REMERON) 45 MG tablet  Last refilled: 1/20/21  Qty: 90      Last seen: 1/12/21  RTC: 8 weeks  Cancel: 0  No-show: 0  Next appt: 4/27/21    Refill decision:   Refill pended and routed to the provider for review/determination due to   Discrepancy in dosing..  Epic med list -mirtazapine (REMERON) 45 MG tablet Take 1 tablet (45 mg) by mouth At Bedtime -      Last note from 1/12/21 states..  Plan:..-Remeron  30 mg QHS for depression, anxiety    Scheduled for follow-up 4/27/21

## 2021-04-21 NOTE — TELEPHONE ENCOUNTER
4/21/21: writer provided reminder phone call of upcoming initial therapy session. Writer will contact via phone unless patient informs writer that she has computer that can complete video connection.    PAWAN Obando, [facility]  [phone]

## 2021-04-23 ENCOUNTER — TELEPHONE (OUTPATIENT)
Dept: PSYCHIATRY | Facility: CLINIC | Age: 56
End: 2021-04-23

## 2021-04-23 NOTE — CONFIDENTIAL NOTE
SW had initial therapy appointment scheduled with Rossi Tavarez at 11:00 am. Writer called, no answer. Writer left message.    Writer left second message at 11:30. Writer unable to meet today due to time. Writer continued to request call back to check on patient well-being and problem solve any concerns regarding initiating therapy.    Provided call back number.    PAWAN Obando, [facility]  [phone]

## 2021-04-27 ENCOUNTER — VIRTUAL VISIT (OUTPATIENT)
Dept: PSYCHIATRY | Facility: CLINIC | Age: 56
End: 2021-04-27
Attending: PSYCHIATRY & NEUROLOGY
Payer: COMMERCIAL

## 2021-04-27 DIAGNOSIS — F41.1 GAD (GENERALIZED ANXIETY DISORDER): ICD-10-CM

## 2021-04-27 DIAGNOSIS — F90.0 ATTENTION DEFICIT HYPERACTIVITY DISORDER (ADHD), PREDOMINANTLY INATTENTIVE TYPE: ICD-10-CM

## 2021-04-27 PROCEDURE — 99214 OFFICE O/P EST MOD 30 MIN: CPT | Mod: 95 | Performed by: PSYCHIATRY & NEUROLOGY

## 2021-04-27 RX ORDER — MIRTAZAPINE 45 MG/1
45 TABLET, FILM COATED ORAL AT BEDTIME
Qty: 30 TABLET | Refills: 3 | Status: SHIPPED | OUTPATIENT
Start: 2021-04-27 | End: 2021-08-02

## 2021-04-27 RX ORDER — METHYLPHENIDATE HYDROCHLORIDE 20 MG/1
TABLET ORAL
Qty: 90 TABLET | Refills: 0 | Status: SHIPPED | OUTPATIENT
Start: 2021-04-27 | End: 2021-04-27

## 2021-04-27 RX ORDER — METHYLPHENIDATE HYDROCHLORIDE 20 MG/1
TABLET ORAL
Qty: 90 TABLET | Refills: 0 | Status: SHIPPED | OUTPATIENT
Start: 2021-04-27 | End: 2021-06-21
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
## 2021-04-30 NOTE — CONFIDENTIAL NOTE
SW left message for Rossi. Writer following up on starting therapy. Writer happy to start with patient, but also very willing to provide outside resources if Rossi is feeling more comfortable with this option. Requested call back and provided contact information.    PAWAN Obando, [facility]  [phone]

## 2021-05-23 ENCOUNTER — HEALTH MAINTENANCE LETTER (OUTPATIENT)
Age: 56
End: 2021-05-23

## 2021-06-21 DIAGNOSIS — F90.0 ATTENTION DEFICIT HYPERACTIVITY DISORDER (ADHD), PREDOMINANTLY INATTENTIVE TYPE: ICD-10-CM

## 2021-06-21 RX ORDER — METHYLPHENIDATE HYDROCHLORIDE 20 MG/1
TABLET ORAL
Qty: 90 TABLET | Refills: 0 | Status: SHIPPED | OUTPATIENT
Start: 2021-06-21 | End: 2021-08-05

## 2021-06-21 NOTE — TELEPHONE ENCOUNTER
Rx Refill - Millicent  Received: Today  Message Contents   Katharine Sim [facility] Psychiatry [facility]   Phone Number:  [phone] (Call me)             [facility] Call Center     Phone Message     May a detailed message be left on voicemail: yes     Reason for Call: Medication Refill Request     Has the patient contacted the pharmacy for the refill? Yes   Name of medication being requested: ritalin   Provider who prescribed the medication: Dr. Ricks   Pharmacy: [facility] [employer] in [place]   Date medication is needed: ASAP - completely out       Action Taken: Message routed to:  Other: nursing     Travel Screening: Not Applicable      Last seen: 4/27  RTC: 8 weeks   Cancel: none   No-show: none   Next appt: none     Incoming refill from patient via phone     Medication requested: methylphenidate (RITALIN) 20 MG tablet  Directions: Take 2 tabs in am and one tab at noon  Qty: 90  Last refilled: 4/27 #90, 3/17 #90, 2/2 #90    Will route to provider for approval

## 2021-07-23 DIAGNOSIS — F41.1 GAD (GENERALIZED ANXIETY DISORDER): ICD-10-CM

## 2021-07-23 NOTE — TELEPHONE ENCOUNTER
Last seen: 4/27  RTC: 8 weeks   Cancel: none   No-show: none   Next appt: none     Incoming refill from pharmacy via Rx Auth     Medication requested: clonazePAM (KLONOPIN) 0.5 MG tablet  Directions: Take 3 tablets (1.5 mg) by mouth nightly as needed for anxiety - Oral  Qty: 90  Last refilled: 2/15 #90, 10/20 #90    Will route to provider for approval

## 2021-07-26 RX ORDER — CLONAZEPAM 0.5 MG/1
1.5 TABLET ORAL
Qty: 90 TABLET | Refills: 0 | Status: SHIPPED | OUTPATIENT
Start: 2021-07-26 | End: 2021-08-02

## 2021-07-29 DIAGNOSIS — F41.1 GAD (GENERALIZED ANXIETY DISORDER): ICD-10-CM

## 2021-07-30 DIAGNOSIS — F41.1 GAD (GENERALIZED ANXIETY DISORDER): ICD-10-CM

## 2021-07-30 NOTE — TELEPHONE ENCOUNTER
DULoxetine (CYMBALTA) 60 MG  Last refilled: 1/12/21  Qty: 180 : 1    Last seen: 4/27/21  RTC: 2 MOS  Cancel: 0  No-show: 0  Next appt: NONE  Scheduling has been notified to contact the pt for appointment.  30 day irwin refill sent to the pharmacy - including instructions for patient to call the clinic and schedule an appointment.  Will send FYI to provider as is outside RTC timeframe.

## 2021-07-31 RX ORDER — DULOXETIN HYDROCHLORIDE 60 MG/1
120 CAPSULE, DELAYED RELEASE ORAL DAILY
Qty: 60 CAPSULE | Refills: 0 | Status: SHIPPED | OUTPATIENT
Start: 2021-07-31 | End: 2021-08-31

## 2021-08-02 DIAGNOSIS — F41.1 GAD (GENERALIZED ANXIETY DISORDER): ICD-10-CM

## 2021-08-02 RX ORDER — CLONAZEPAM 0.5 MG/1
1.5 TABLET ORAL
Qty: 90 TABLET | Refills: 0 | Status: SHIPPED | OUTPATIENT
Start: 2021-08-02 | End: 2021-08-31

## 2021-08-02 NOTE — TELEPHONE ENCOUNTER
FW: Millicent's pt  Received: Today  Latisha Frausto, RN  Latisha Frausto, RN  Phone Number: [phone]          Previous Messages       ----- Message -----   From: Mckayla Michel   Sent: 8/2/2021  11:57 AM CDT   To: [facility] Psychiatry [facility] [place]   Subject: Millicent's pt                                     Caller:  Self   Relationship to pt: Self   Medication:   clonazePAM (KLONOPIN) 0.5 MG tablet   methylphenidate (RITALIN) 20 MG tablet   How many days left of med do you have left (if >3 day supply, redirect to pharmacy): 0 for clonazepam, 1-2 days methylphenidate   Pharmacy and location: [facility] [id number] [place], [place] - [address]   Pending appt date:  8/31 @ 1:30pm   Okay to leave detailed VM:  yes, [phone]

## 2021-08-02 NOTE — TELEPHONE ENCOUNTER
mirtazapine (REMERON) 45 MG   Last refilled: 4/27/21  Qty: 30  : 3    Last seen: 4/27/21  RTC: 8 weeks  Cancel: 0  No-show: 0  Next appt: 8/31/21  Refill decision: Refilled for 30 days per protocol.  Will send FYI to provider as is outside RTC timeframe.

## 2021-08-02 NOTE — TELEPHONE ENCOUNTER
Last seen: 8/31  RTC: 8 weeks   Cancel: none   No-show: none   Next appt: 8/31    Incoming refill from patient via phone     Medication requested: clonazePAM (KLONOPIN) 0.5 MG tablet  Directions:TAKE 3 TABLETS (1.5 MG) BY MOUTH NIGHTLY AS NEEDED FOR ANXIETY - Oral   Qty: 90  Last refilled: 6/21 #90, 4/27 #90, 3/17 #90    Will route to provider for approval

## 2021-08-02 NOTE — TELEPHONE ENCOUNTER
- Meds refilled by provider   - Med tab changed to reflect this     Placed a call to patient to update regarding refills. No answer at number provided. Unable to leave voice mail due to not option.

## 2021-08-04 DIAGNOSIS — F41.1 GAD (GENERALIZED ANXIETY DISORDER): ICD-10-CM

## 2021-08-05 DIAGNOSIS — F90.0 ATTENTION DEFICIT HYPERACTIVITY DISORDER (ADHD), PREDOMINANTLY INATTENTIVE TYPE: ICD-10-CM

## 2021-08-05 RX ORDER — METHYLPHENIDATE HYDROCHLORIDE 20 MG/1
TABLET ORAL
Qty: 90 TABLET | Refills: 0 | Status: SHIPPED | OUTPATIENT
Start: 2021-08-05 | End: 2021-08-31

## 2021-08-05 NOTE — TELEPHONE ENCOUNTER
Last seen: 4/27  RTC: 8 weeks   Cancel: none   No-show: none   Next appt: 8/31    Incoming refill from patient via phone     Medication requested: methylphenidate (RITALIN) 20 MG tablet  Directions: Take 2 tabs in am and one tab at noon  Qty: 90  Last refilled:6/21 #90, 4/27 #90, 3/17 #90    Medication requested:mirtazapine (REMERON) 45 MG tablet   Directions: Take 1 tablet (45 mg) by mouth At Bedtime - Oral  Qty:30  Last refilled: 4/27  - Should have refills on file     Per chart review, patient should have one refill on file at the pharmacy. Placed a call to [facility] [id number] [place] [facility] [address] [facility] and confirmed refill for Remeron on file. Will route to covering provider for approval.

## 2021-08-06 RX ORDER — TRAZODONE HYDROCHLORIDE 100 MG/1
TABLET ORAL
Qty: 90 TABLET | Refills: 0 | Status: SHIPPED | OUTPATIENT
Start: 2021-08-06 | End: 2021-08-31

## 2021-08-06 NOTE — TELEPHONE ENCOUNTER
Medication requested: TRAZODONE 100 MG TABLET   Last refilled: 1/12/2021  Qty: 270/1      Last seen: 4/27/21  RTC: 8 weeks  Cancel: 0  No-show: 0  Next appt: 8/31/21    Refill decision:   Refilled for 30 days per protocol.

## 2021-08-11 RX ORDER — MIRTAZAPINE 45 MG/1
45 TABLET, FILM COATED ORAL AT BEDTIME
Qty: 30 TABLET | Refills: 1 | Status: SHIPPED | OUTPATIENT
Start: 2021-08-11 | End: 2021-08-31

## 2021-08-24 DIAGNOSIS — F41.1 GAD (GENERALIZED ANXIETY DISORDER): ICD-10-CM

## 2021-08-27 RX ORDER — DULOXETIN HYDROCHLORIDE 60 MG/1
120 CAPSULE, DELAYED RELEASE ORAL DAILY
Qty: 60 CAPSULE | Refills: 0 | OUTPATIENT
Start: 2021-08-27

## 2021-08-31 ENCOUNTER — VIRTUAL VISIT (OUTPATIENT)
Dept: PSYCHIATRY | Facility: CLINIC | Age: 56
End: 2021-08-31
Attending: PSYCHIATRY & NEUROLOGY
Payer: COMMERCIAL

## 2021-08-31 DIAGNOSIS — F90.0 ATTENTION DEFICIT HYPERACTIVITY DISORDER (ADHD), PREDOMINANTLY INATTENTIVE TYPE: ICD-10-CM

## 2021-08-31 DIAGNOSIS — F41.1 GAD (GENERALIZED ANXIETY DISORDER): ICD-10-CM

## 2021-08-31 PROCEDURE — 90833 PSYTX W PT W E/M 30 MIN: CPT | Mod: 95 | Performed by: PSYCHIATRY & NEUROLOGY

## 2021-08-31 PROCEDURE — 99214 OFFICE O/P EST MOD 30 MIN: CPT | Mod: 95 | Performed by: PSYCHIATRY & NEUROLOGY

## 2021-08-31 RX ORDER — METHYLPHENIDATE HYDROCHLORIDE 20 MG/1
TABLET ORAL
Qty: 90 TABLET | Refills: 0 | Status: SHIPPED | OUTPATIENT
Start: 2021-08-31 | End: 2021-10-19

## 2021-08-31 RX ORDER — METHYLPHENIDATE HYDROCHLORIDE 20 MG/1
TABLET ORAL
Qty: 90 TABLET | Refills: 0 | Status: SHIPPED | OUTPATIENT
Start: 2021-08-31 | End: 2021-08-31

## 2021-08-31 RX ORDER — BUSPIRONE HYDROCHLORIDE 10 MG/1
TABLET ORAL
Qty: 630 TABLET | Refills: 1 | Status: SHIPPED | OUTPATIENT
Start: 2021-08-31 | End: 2022-02-08

## 2021-08-31 RX ORDER — CLONAZEPAM 0.5 MG/1
1.5 TABLET ORAL
Qty: 90 TABLET | Refills: 2 | Status: SHIPPED | OUTPATIENT
Start: 2021-08-31 | End: 2022-02-08

## 2021-08-31 RX ORDER — MIRTAZAPINE 45 MG/1
45 TABLET, FILM COATED ORAL AT BEDTIME
Qty: 90 TABLET | Refills: 1 | Status: SHIPPED | OUTPATIENT
Start: 2021-08-31 | End: 2022-02-08

## 2021-08-31 RX ORDER — TRAZODONE HYDROCHLORIDE 100 MG/1
TABLET ORAL
Qty: 120 TABLET | Refills: 2 | Status: SHIPPED | OUTPATIENT
Start: 2021-08-31 | End: 2021-10-19

## 2021-08-31 RX ORDER — DULOXETIN HYDROCHLORIDE 60 MG/1
120 CAPSULE, DELAYED RELEASE ORAL DAILY
Qty: 60 CAPSULE | Refills: 0 | Status: SHIPPED | OUTPATIENT
Start: 2021-08-31 | End: 2021-09-27

## 2021-08-31 RX ORDER — MIRTAZAPINE 45 MG/1
45 TABLET, FILM COATED ORAL AT BEDTIME
Qty: 30 TABLET | Refills: 1 | Status: SHIPPED | OUTPATIENT
Start: 2021-08-31 | End: 2021-08-31
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
## 2021-09-05 DIAGNOSIS — F41.1 GAD (GENERALIZED ANXIETY DISORDER): ICD-10-CM

## 2021-09-08 RX ORDER — TRAZODONE HYDROCHLORIDE 100 MG/1
TABLET ORAL
Qty: 90 TABLET | OUTPATIENT
Start: 2021-09-08

## 2021-09-12 ENCOUNTER — HEALTH MAINTENANCE LETTER (OUTPATIENT)
Age: 56
End: 2021-09-12

## 2021-09-24 DIAGNOSIS — F41.1 GAD (GENERALIZED ANXIETY DISORDER): ICD-10-CM

## 2021-09-24 NOTE — TELEPHONE ENCOUNTER
Rx Refill - [facility]  Received: Today  Katharine Sim [facility] Psychiatry [facility]  Phone Number:  [phone] (Call me)     Caller:  Rossi Xiaomo   Relationship to pt: self   Medication:   methylphenidate (RITALIN) 20 MG tablet   How many days left of med do you have left (if >3 day supply, redirect to pharmacy): 0   Pharmacy and location: [place] [address] [place] [address] [facility] (Ph: [phone])   Pending appt date:  10/19/21   Okay to leave detailed VM:  yes     Follow up:     Last seen: 10/19  RTC: 8 weeks   Cancel: none   No-show: none   Next appt: 10/19    Incoming refill from patient via phone      Disp Refills Start End AARTI   methylphenidate (RITALIN) 20 MG tablet 90 tablet 0 8/31/2021  No   Sig: Take 2 tabs in am and one tab at noon.   Sent to pharmacy as: Methylphenidate HCl 20 MG Oral Tablet (RITALIN)   Class: E-Prescribe   Earliest Fill Date: 8/31/2021   Notes to Pharmacy: donot fill until October 20, 2021   Order: [id number]   E-Prescribing Status: Receipt confirmed by pharmacy (8/31/2021  2:14 PM CDT   Per  last refilled: 8/5 #90, 6/21 #90, 4/27 #90      Disp Refills Start End AARTI   DULoxetine (CYMBALTA) 60 MG capsule 60 capsule 0 8/31/2021  No   Sig - Route: Take 2 capsules (120 mg) by mouth daily *PLEASE  SCHEDULE APPT. FOR REFILLS [phone]* - Oral   Sent to pharmacy as: DULoxetine HCl 60 MG Oral Capsule Delayed Release Particles (CYMBALTA)   Class: E-Prescribe   Order: [id number]   E-Prescribing Status: Receipt confirmed by pharmacy (8/31/2021  2:12 PM CDT)     Per chart review, patient should have refills for Ritalin on file at the pharmacy. Placed a call to [place] [address] [place] [address] [facility] and confirmed refills on file. Patient notified.     Will route to provider for Duloxetine refill

## 2021-09-27 RX ORDER — DULOXETIN HYDROCHLORIDE 60 MG/1
120 CAPSULE, DELAYED RELEASE ORAL DAILY
Qty: 60 CAPSULE | Refills: 3 | Status: SHIPPED | OUTPATIENT
Start: 2021-09-27 | End: 2022-02-07

## 2021-10-19 ENCOUNTER — VIRTUAL VISIT (OUTPATIENT)
Dept: PSYCHIATRY | Facility: CLINIC | Age: 56
End: 2021-10-19
Attending: PSYCHIATRY & NEUROLOGY
Payer: COMMERCIAL

## 2021-10-19 DIAGNOSIS — F41.1 GAD (GENERALIZED ANXIETY DISORDER): ICD-10-CM

## 2021-10-19 DIAGNOSIS — F90.0 ATTENTION DEFICIT HYPERACTIVITY DISORDER (ADHD), PREDOMINANTLY INATTENTIVE TYPE: ICD-10-CM

## 2021-10-19 PROCEDURE — 90833 PSYTX W PT W E/M 30 MIN: CPT | Mod: 95 | Performed by: PSYCHIATRY & NEUROLOGY

## 2021-10-19 PROCEDURE — 99214 OFFICE O/P EST MOD 30 MIN: CPT | Mod: 95 | Performed by: PSYCHIATRY & NEUROLOGY

## 2021-10-19 RX ORDER — TRAZODONE HYDROCHLORIDE 100 MG/1
TABLET ORAL
Qty: 120 TABLET | Refills: 2 | Status: SHIPPED | OUTPATIENT
Start: 2021-10-19 | End: 2022-02-08

## 2021-10-19 RX ORDER — METHYLPHENIDATE HYDROCHLORIDE 20 MG/1
TABLET ORAL
Qty: 90 TABLET | Refills: 0 | Status: SHIPPED | OUTPATIENT
Start: 2021-10-19 | End: 2022-02-07

## 2021-10-19 RX ORDER — METHYLPHENIDATE HYDROCHLORIDE 20 MG/1
TABLET ORAL
Qty: 90 TABLET | Refills: 0 | Status: SHIPPED | OUTPATIENT
Start: 2021-10-19 | End: 2021-10-19
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
## 2021-12-19 DIAGNOSIS — F41.1 GAD (GENERALIZED ANXIETY DISORDER): ICD-10-CM

## 2021-12-21 PROBLEM — F33.9 MAJOR DEPRESSION, RECURRENT (H): Status: ACTIVE | Noted: 2021-12-21

## 2021-12-22 DIAGNOSIS — F41.1 GAD (GENERALIZED ANXIETY DISORDER): ICD-10-CM

## 2021-12-22 RX ORDER — DULOXETIN HYDROCHLORIDE 60 MG/1
CAPSULE, DELAYED RELEASE ORAL
Qty: 60 CAPSULE | Refills: 3 | OUTPATIENT
Start: 2021-12-22

## 2021-12-22 NOTE — TELEPHONE ENCOUNTER
Refill denied    Too soon  script sent 9/27/21 #60-3refills   No showed for appt 12/21/21   Scheduling has been notified to contact the pt for appointment.

## 2021-12-27 RX ORDER — TRAZODONE HYDROCHLORIDE 100 MG/1
TABLET ORAL
Qty: 270 TABLET | Refills: 1 | OUTPATIENT
Start: 2021-12-27

## 2022-01-13 DIAGNOSIS — F41.1 GAD (GENERALIZED ANXIETY DISORDER): ICD-10-CM

## 2022-01-17 RX ORDER — DULOXETIN HYDROCHLORIDE 60 MG/1
CAPSULE, DELAYED RELEASE ORAL
Qty: 60 CAPSULE | Refills: 3 | OUTPATIENT
Start: 2022-01-17

## 2022-02-04 DIAGNOSIS — F90.0 ATTENTION DEFICIT HYPERACTIVITY DISORDER (ADHD), PREDOMINANTLY INATTENTIVE TYPE: ICD-10-CM

## 2022-02-04 DIAGNOSIS — F41.1 GAD (GENERALIZED ANXIETY DISORDER): ICD-10-CM

## 2022-02-04 NOTE — TELEPHONE ENCOUNTER
[facility] Call Center    Phone Message    May a detailed message be left on voicemail: yes     Reason for Call: Medication Refill Request    Has the patient contacted the pharmacy for the refill? Yes   Name of medication being requested: Ritjames Duloxetine  Provider who prescribed the medication: Millicent  Pharmacy: [facility] [id number] [place], [place] - [address]    Date medication is needed: Pt is out in a few days.         Action Taken: Message routed to:  Other: nursing pool    Travel Screening: Not Applicable

## 2022-02-07 RX ORDER — METHYLPHENIDATE HYDROCHLORIDE 20 MG/1
TABLET ORAL
Qty: 90 TABLET | Refills: 0 | Status: SHIPPED | OUTPATIENT
Start: 2022-02-07 | End: 2022-02-08

## 2022-02-07 RX ORDER — DULOXETIN HYDROCHLORIDE 60 MG/1
120 CAPSULE, DELAYED RELEASE ORAL DAILY
Qty: 60 CAPSULE | Refills: 3 | Status: SHIPPED | OUTPATIENT
Start: 2022-02-07 | End: 2022-05-24

## 2022-02-08 ENCOUNTER — VIRTUAL VISIT (OUTPATIENT)
Dept: PSYCHIATRY | Facility: CLINIC | Age: 57
End: 2022-02-08
Attending: PSYCHIATRY & NEUROLOGY
Payer: COMMERCIAL

## 2022-02-08 DIAGNOSIS — F41.1 GAD (GENERALIZED ANXIETY DISORDER): ICD-10-CM

## 2022-02-08 DIAGNOSIS — F90.0 ATTENTION DEFICIT HYPERACTIVITY DISORDER (ADHD), PREDOMINANTLY INATTENTIVE TYPE: ICD-10-CM

## 2022-02-08 PROCEDURE — 99214 OFFICE O/P EST MOD 30 MIN: CPT | Mod: 95 | Performed by: PSYCHIATRY & NEUROLOGY

## 2022-02-08 PROCEDURE — 90833 PSYTX W PT W E/M 30 MIN: CPT | Mod: 95 | Performed by: PSYCHIATRY & NEUROLOGY

## 2022-02-08 RX ORDER — BUSPIRONE HYDROCHLORIDE 10 MG/1
TABLET ORAL
Qty: 630 TABLET | Refills: 1 | Status: SHIPPED | OUTPATIENT
Start: 2022-02-08 | End: 2022-07-05

## 2022-02-08 RX ORDER — METHYLPHENIDATE HYDROCHLORIDE 20 MG/1
TABLET ORAL
Qty: 90 TABLET | Refills: 0 | Status: SHIPPED | OUTPATIENT
Start: 2022-02-08 | End: 2022-02-08

## 2022-02-08 RX ORDER — CLONAZEPAM 0.5 MG/1
1.5 TABLET ORAL
Qty: 90 TABLET | Refills: 2 | Status: SHIPPED | OUTPATIENT
Start: 2022-02-08 | End: 2022-07-05

## 2022-02-08 RX ORDER — MIRTAZAPINE 45 MG/1
45 TABLET, FILM COATED ORAL AT BEDTIME
Qty: 90 TABLET | Refills: 1 | Status: SHIPPED | OUTPATIENT
Start: 2022-02-08 | End: 2022-07-05

## 2022-02-08 RX ORDER — TRAZODONE HYDROCHLORIDE 100 MG/1
TABLET ORAL
Qty: 120 TABLET | Refills: 2 | Status: SHIPPED | OUTPATIENT
Start: 2022-02-08 | End: 2022-05-24

## 2022-02-08 RX ORDER — METHYLPHENIDATE HYDROCHLORIDE 20 MG/1
TABLET ORAL
Qty: 90 TABLET | Refills: 0 | Status: SHIPPED | OUTPATIENT
Start: 2022-02-08 | End: 2022-04-29
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
## 2022-03-18 DIAGNOSIS — F41.1 GAD (GENERALIZED ANXIETY DISORDER): ICD-10-CM

## 2022-03-18 RX ORDER — TRAZODONE HYDROCHLORIDE 100 MG/1
TABLET ORAL
Qty: 270 TABLET | Refills: 1 | OUTPATIENT
Start: 2022-03-18

## 2022-03-18 NOTE — TELEPHONE ENCOUNTER
Medication requested: TRAZODONE 100 MG TABLET  TAKE 2 TO 3 TABLETS BY MOUTH AT BEDTIME AS NEEDED FOR SLEEP  Last refilled: 2/8/22  Qty: 120/2  [id number] [place] [facility] [address]    Last seen: 2/8/22  RTC:8 weeks  Cancel: 0  No-show: 0  Next appt: 4/5/22    Refill decision:   Too soon for refill. 30 day/ 2 Rf sent 2/8/22 to [id number]

## 2022-04-29 DIAGNOSIS — F90.0 ATTENTION DEFICIT HYPERACTIVITY DISORDER (ADHD), PREDOMINANTLY INATTENTIVE TYPE: ICD-10-CM

## 2022-04-29 RX ORDER — METHYLPHENIDATE HYDROCHLORIDE 20 MG/1
TABLET ORAL
Qty: 90 TABLET | Refills: 0 | Status: SHIPPED | OUTPATIENT
Start: 2022-04-29 | End: 2022-05-24

## 2022-04-29 NOTE — TELEPHONE ENCOUNTER
Last seen: 2/8/22  RTC:  8 weeks   Cancel: none  No-show: 4/5/22  Next appt: 5/24/22     Incoming refill from Patient via [facility]     Medication requested:     methylphenidate (RITALIN) 20 MG tablet 90 tablet 0 2/8/2022  No   Sig: Take 2 tabs in am and one tab at noon.   Sent to pharmacy as: Methylphenidate HCl 20 MG Oral Tablet (RITALIN)   Class: E-Prescribe   Earliest Fill Date: 2/8/2022   Notes to Pharmacy: Do not fill until March 30, 2022     Last refill per         From chart note: Continue  methyphenidate  20 mg at 6 am, one at 8am  in am and additional 20 mg with last dose in the early afternoon. -use this form instead of concerta due to cost.    Medication sent to provider for review.    ____________      Marianna Londono Psychiatry Nurse-[facility] Call Center     Phone Message     May a detailed message be left on voicemail: yes     Reason for Call: Medication Refill Request     Has the patient contacted the pharmacy for the refill? Yes   Name of medication being requested: Ritalin   Provider who prescribed the medication: Dr. Ricks   Pharmacy: [facility] [id number] [place], [place] - [address]   Date medication is needed: ASAP, pt says she has zero left     Reminded pt that it usually takes 3 days, but would send high priority.     Action Taken: Message routed to:  Other: [facility] Psychiatry [facility]     Travel Screening: Not Applicable

## 2022-05-05 DIAGNOSIS — F41.1 GAD (GENERALIZED ANXIETY DISORDER): ICD-10-CM

## 2022-05-05 RX ORDER — DULOXETIN HYDROCHLORIDE 60 MG/1
CAPSULE, DELAYED RELEASE ORAL
Qty: 60 CAPSULE | Refills: 3 | OUTPATIENT
Start: 2022-05-05

## 2022-05-15 DIAGNOSIS — F41.1 GAD (GENERALIZED ANXIETY DISORDER): ICD-10-CM

## 2022-05-16 RX ORDER — TRAZODONE HYDROCHLORIDE 100 MG/1
TABLET ORAL
Qty: 270 TABLET | Refills: 1 | OUTPATIENT
Start: 2022-05-16

## 2022-05-24 ENCOUNTER — VIRTUAL VISIT (OUTPATIENT)
Dept: PSYCHIATRY | Facility: CLINIC | Age: 57
End: 2022-05-24
Attending: PSYCHIATRY & NEUROLOGY
Payer: COMMERCIAL

## 2022-05-24 DIAGNOSIS — F41.1 GAD (GENERALIZED ANXIETY DISORDER): ICD-10-CM

## 2022-05-24 DIAGNOSIS — F90.0 ATTENTION DEFICIT HYPERACTIVITY DISORDER (ADHD), PREDOMINANTLY INATTENTIVE TYPE: ICD-10-CM

## 2022-05-24 PROCEDURE — 99214 OFFICE O/P EST MOD 30 MIN: CPT | Mod: 95 | Performed by: PSYCHIATRY & NEUROLOGY

## 2022-05-24 PROCEDURE — 90833 PSYTX W PT W E/M 30 MIN: CPT | Mod: 95 | Performed by: PSYCHIATRY & NEUROLOGY

## 2022-05-24 RX ORDER — TRAZODONE HYDROCHLORIDE 100 MG/1
TABLET ORAL
Qty: 120 TABLET | Refills: 2 | Status: SHIPPED | OUTPATIENT
Start: 2022-05-24 | End: 2022-10-25

## 2022-05-24 RX ORDER — METHYLPHENIDATE HYDROCHLORIDE 20 MG/1
TABLET ORAL
Qty: 90 TABLET | Refills: 0 | Status: SHIPPED | OUTPATIENT
Start: 2022-05-24 | End: 2022-07-05
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
## 2022-05-24 NOTE — PATIENT INSTRUCTIONS
**For crisis resources, please see the information at the end of this document**   Patient Education    Thank you for coming to the [facility] [place] [facility].    Lab Testing:  If you had lab testing today and your results are reassuring or normal they will be mailed to you or sent through [employer] within 7 days. If the lab tests need quick action we will call you with the results. The phone number we will call with results is # [phone]. If this is not the best number please call our clinic and change the number.     Medication Refills:  If you need any refills please call your pharmacy and they will contact us. Our fax number for refills is [phone].   Three business days of notice are needed for general medication refill requests.   Five business days of notice are needed for controlled substance refill requests.   If you need to change to a different pharmacy, please contact the new pharmacy directly. The new pharmacy will help you get your medications transferred.     Contact Us:  Please call [phone] during business hours (8-5:00 M-F).  If you have medication related questions after clinic hours, or on the weekend, please call [phone].    Financial Assistance [phone]  Medical Records [phone]       MENTAL HEALTH CRISIS RESOURCES:  For a emergency help, please call 911 or go to the nearest Emergency Department.     Emergency Walk-In Options:   Em[facility] Unit @ [facility] ([place]): [phone] - Specialized mental health emergency area designed to be calming  [facility] ([place]): [phone]  [facility] ([place]): [phone]  [facility]): [phone]    [facility] Crisis Information:   [place]: [phone]  [place]: [phone]  [place] ([place]) - Adult: [phone]     Child: [phone]  [place] - Adult: [phone]     Child: [phone]  [place]:  [phone]  List of all [facility] resources:   https://[place].gov/dhs/people-we-serve/adults/health-care/mental-health/resources/crisis-contacts.jsp    National Crisis Information:   Crisis Text Line: Text  [place]  to 704195  National Suicide Prevention Lifeline: 4-420-989-TALK (1-269.671.4712)       For online chat options, visit https://suicidepreventionlifeline.org/chat/  Poison Control Center: 0-897-131-5463  Trans Lifeline: 7-508-605-8232 - Hotline for transgender people of all ages  [employer]: 0-575-026-3040 - Hotline for LGBT youth     For Non-Emergency Support:   Fast Tracker: Mental Health & Substance Use Disorder Resources -   https://www.WalletKitn.org/

## 2022-06-16 DIAGNOSIS — F90.0 ATTENTION DEFICIT HYPERACTIVITY DISORDER (ADHD), PREDOMINANTLY INATTENTIVE TYPE: ICD-10-CM

## 2022-06-16 DIAGNOSIS — F41.1 GAD (GENERALIZED ANXIETY DISORDER): ICD-10-CM

## 2022-06-19 ENCOUNTER — HEALTH MAINTENANCE LETTER (OUTPATIENT)
Age: 57
End: 2022-06-19

## 2022-07-18 DIAGNOSIS — F41.1 GAD (GENERALIZED ANXIETY DISORDER): ICD-10-CM

## 2022-07-18 DIAGNOSIS — F90.0 ATTENTION DEFICIT HYPERACTIVITY DISORDER (ADHD), PREDOMINANTLY INATTENTIVE TYPE: ICD-10-CM

## 2022-08-09 DIAGNOSIS — F90.0 ATTENTION DEFICIT HYPERACTIVITY DISORDER (ADHD), PREDOMINANTLY INATTENTIVE TYPE: ICD-10-CM

## 2022-08-09 DIAGNOSIS — F41.1 GAD (GENERALIZED ANXIETY DISORDER): ICD-10-CM

## 2022-08-19 DIAGNOSIS — F41.1 GAD (GENERALIZED ANXIETY DISORDER): ICD-10-CM

## 2022-08-19 RX ORDER — TRAZODONE HYDROCHLORIDE 100 MG/1
TABLET ORAL
Qty: 270 TABLET | Refills: 1 | OUTPATIENT
Start: 2022-08-19

## 2022-09-15 DIAGNOSIS — F41.1 GAD (GENERALIZED ANXIETY DISORDER): ICD-10-CM

## 2022-09-15 DIAGNOSIS — F90.0 ATTENTION DEFICIT HYPERACTIVITY DISORDER (ADHD), PREDOMINANTLY INATTENTIVE TYPE: ICD-10-CM

## 2022-10-06 DIAGNOSIS — F90.0 ATTENTION DEFICIT HYPERACTIVITY DISORDER (ADHD), PREDOMINANTLY INATTENTIVE TYPE: ICD-10-CM

## 2022-10-06 DIAGNOSIS — F41.1 GAD (GENERALIZED ANXIETY DISORDER): ICD-10-CM

## 2022-10-07 RX ORDER — FLUOXETINE 40 MG/1
CAPSULE ORAL
Qty: 60 CAPSULE | Refills: 3 | OUTPATIENT
Start: 2022-10-07

## 2022-10-24 DIAGNOSIS — F41.1 GAD (GENERALIZED ANXIETY DISORDER): ICD-10-CM

## 2022-10-25 RX ORDER — TRAZODONE HYDROCHLORIDE 100 MG/1
200-300 TABLET ORAL
Qty: 90 TABLET | Refills: 0 | Status: SHIPPED | OUTPATIENT
Start: 2022-10-25 | End: 2023-06-27

## 2022-10-26 NOTE — TELEPHONE ENCOUNTER
Medication requested: traZODone (DESYREL) 100 MG tablet  Last refilled: 5/24/22  Qty: 120/2      Last seen: 9/13/22  RTC: 8 weeks  Cancel: 0  No-show: 0  Next appt: 11/8/22    Refill decision:   Refilled for 30 days per protocol.

## 2022-11-08 ENCOUNTER — VIRTUAL VISIT (OUTPATIENT)
Dept: PSYCHIATRY | Facility: CLINIC | Age: 57
End: 2022-11-08
Attending: PSYCHIATRY & NEUROLOGY
Payer: COMMERCIAL

## 2022-11-08 DIAGNOSIS — F90.0 ATTENTION DEFICIT HYPERACTIVITY DISORDER (ADHD), PREDOMINANTLY INATTENTIVE TYPE: ICD-10-CM

## 2022-11-08 PROCEDURE — 99214 OFFICE O/P EST MOD 30 MIN: CPT | Mod: 95 | Performed by: PSYCHIATRY & NEUROLOGY

## 2022-11-08 PROCEDURE — 90833 PSYTX W PT W E/M 30 MIN: CPT | Mod: 95 | Performed by: PSYCHIATRY & NEUROLOGY

## 2022-11-08 RX ORDER — METHYLPHENIDATE HYDROCHLORIDE 20 MG/1
TABLET ORAL
Qty: 90 TABLET | Refills: 0 | Status: SHIPPED | OUTPATIENT
Start: 2022-11-08 | End: 2023-01-24
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
## 2022-11-08 NOTE — PATIENT INSTRUCTIONS
**For crisis resources, please see the information at the end of this document**   Patient Education    Thank you for coming to the [facility] [place] [facility].     Lab Testing:  If you had lab testing today and your results are reassuring or normal they will be mailed to you or sent through [employer] within 7 days. If the lab tests need quick action we will call you with the results. The phone number we will call with results is # [phone]. If this is not the best number please call our clinic and change the number.     Medication Refills:  If you need any refills please call your pharmacy and they will contact us. Our fax number for refills is [phone].   Three business days of notice are needed for general medication refill requests.   Five business days of notice are needed for controlled substance refill requests.   If you need to change to a different pharmacy, please contact the new pharmacy directly. The new pharmacy will help you get your medications transferred.     Contact Us:  Please call [phone] during business hours (8-5:00 M-F).   If you have medication related questions after clinic hours, or on the weekend, please call [phone].     Financial Assistance [phone]   Medical Records [phone]       MENTAL HEALTH CRISIS RESOURCES:  For a emergency help, please call 911 or go to the nearest Emergency Department.     Emergency Walk-In Options:   Em[facility] Unit @ [facility] ([place]): [phone] - Specialized mental health emergency area designed to be calming  [facility] ([place]): [phone]  [facility] ([place]): [phone]  [facility]): [phone]    [facility] Crisis Information:   [place]: [phone]  [place]: [phone]  [place] ([place]) - Adult: [phone]     Child: [phone]  [place] - Adult: [phone]     Child: [phone]  [place]:  [phone]  List of all [facility] resources:   https://[place].gov/dhs/people-we-serve/adults/health-care/mental-health/resources/crisis-contacts.jsp    National Crisis Information:   Crisis Text Line: Text  [place]  to 296403  Suicide & Crisis Lifeline: 988  National Suicide Prevention Lifeline: 5-118-782-TALK (1-671.989.4572)       For online chat options, visit https://suicidepreventionlifeline.org/chat/  Poison Control Center: 6-600-768-4978  Trans Lifeline: 2-536-880-2112 - Hotline for transgender people of all ages  [employer]: 8-912-410-9339 - Hotline for LGBT youth     For Non-Emergency Support:   Fast Tracker: Mental Health & Substance Use Disorder Resources -   https://www.[employer].org/

## 2022-11-19 ENCOUNTER — HEALTH MAINTENANCE LETTER (OUTPATIENT)
Age: 57
End: 2022-11-19

## 2022-12-13 ENCOUNTER — VIRTUAL VISIT (OUTPATIENT)
Dept: PSYCHIATRY | Facility: CLINIC | Age: 57
End: 2022-12-13
Attending: PSYCHIATRY & NEUROLOGY
Payer: COMMERCIAL

## 2022-12-13 DIAGNOSIS — F90.0 ATTENTION DEFICIT HYPERACTIVITY DISORDER (ADHD), PREDOMINANTLY INATTENTIVE TYPE: ICD-10-CM

## 2022-12-13 DIAGNOSIS — F41.1 GAD (GENERALIZED ANXIETY DISORDER): ICD-10-CM

## 2022-12-13 PROCEDURE — 90833 PSYTX W PT W E/M 30 MIN: CPT | Mod: 95 | Performed by: PSYCHIATRY & NEUROLOGY

## 2022-12-13 PROCEDURE — 99214 OFFICE O/P EST MOD 30 MIN: CPT | Mod: 95 | Performed by: PSYCHIATRY & NEUROLOGY

## 2022-12-13 RX ORDER — FLUOXETINE 40 MG/1
80 CAPSULE ORAL DAILY
Qty: 60 CAPSULE | Refills: 3 | Status: SHIPPED | OUTPATIENT
Start: 2022-12-13 | End: 2023-01-24

## 2022-12-13 RX ORDER — LISINOPRIL/HYDROCHLOROTHIAZIDE 10-12.5 MG
1 TABLET ORAL DAILY
COMMUNITY
Start: 2022-12-09

## 2022-12-13 RX ORDER — MIRTAZAPINE 45 MG/1
45 TABLET, FILM COATED ORAL AT BEDTIME
Qty: 90 TABLET | Refills: 1 | Status: SHIPPED | OUTPATIENT
Start: 2022-12-13 | End: 2023-06-28

## 2022-12-13 RX ORDER — BUSPIRONE HYDROCHLORIDE 10 MG/1
TABLET ORAL
Qty: 630 TABLET | Refills: 1 | Status: SHIPPED | OUTPATIENT
Start: 2022-12-13 | End: 2023-06-05
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
## 2023-01-24 ENCOUNTER — VIRTUAL VISIT (OUTPATIENT)
Dept: PSYCHIATRY | Facility: CLINIC | Age: 58
End: 2023-01-24
Attending: PSYCHIATRY & NEUROLOGY
Payer: COMMERCIAL

## 2023-01-24 DIAGNOSIS — F41.1 GAD (GENERALIZED ANXIETY DISORDER): ICD-10-CM

## 2023-01-24 DIAGNOSIS — F90.0 ATTENTION DEFICIT HYPERACTIVITY DISORDER (ADHD), PREDOMINANTLY INATTENTIVE TYPE: ICD-10-CM

## 2023-01-24 PROCEDURE — 90833 PSYTX W PT W E/M 30 MIN: CPT | Mod: 95 | Performed by: PSYCHIATRY & NEUROLOGY

## 2023-01-24 PROCEDURE — 99214 OFFICE O/P EST MOD 30 MIN: CPT | Mod: 95 | Performed by: PSYCHIATRY & NEUROLOGY

## 2023-01-24 RX ORDER — METHYLPHENIDATE HYDROCHLORIDE 20 MG/1
TABLET ORAL
Qty: 90 TABLET | Refills: 0 | Status: SHIPPED | OUTPATIENT
Start: 2023-01-24 | End: 2023-03-14

## 2023-01-24 RX ORDER — FLUOXETINE 40 MG/1
80 CAPSULE ORAL DAILY
Qty: 60 CAPSULE | Refills: 3 | Status: SHIPPED | OUTPATIENT
Start: 2023-01-24 | End: 2023-06-05 | Stop reason: ALTCHOICE

## 2023-01-24 NOTE — NURSING NOTE
Pt has requested removal of the following from med list:  -Trazodone 100 mg-no longer taking  -Ritalin 20 mg-duplicate listed twice    Pt declined going over qnrs on the phone due to time.

## 2023-01-24 NOTE — PATIENT INSTRUCTIONS
**For crisis resources, please see the information at the end of this document**   Patient Education    Thank you for coming to the [facility] [place] [facility].     Lab Testing:  If you had lab testing today and your results are reassuring or normal they will be mailed to you or sent through [employer] within 7 days. If the lab tests need quick action we will call you with the results. The phone number we will call with results is # [phone]. If this is not the best number please call our clinic and change the number.     Medication Refills:  If you need any refills please call your pharmacy and they will contact us. Our fax number for refills is [phone].   Three business days of notice are needed for general medication refill requests.   Five business days of notice are needed for controlled substance refill requests.   If you need to change to a different pharmacy, please contact the new pharmacy directly. The new pharmacy will help you get your medications transferred.     Contact Us:  Please call [phone] during business hours (8-5:00 M-F).   If you have medication related questions after clinic hours, or on the weekend, please call [phone].     Financial Assistance [phone]   Medical Records [phone]       MENTAL HEALTH CRISIS RESOURCES:  For a emergency help, please call 911 or go to the nearest Emergency Department.     Emergency Walk-In Options:   EmPATH Unit @ [facility] ([place]): [phone] - Specialized mental health emergency area designed to be calming  [facility] ([place]): [phone]  [facility] ([place]): [phone]  [facility]): [phone]    [facility] Crisis Information:   [place]: [phone]  [place]: [phone]  [place] ([place]) - Adult: [phone]     Child: [phone]  [place] - Adult: [phone]     Child: [phone]  [place]:  [phone]  List of all [facility] resources:   https://[place].gov/dhs/people-we-serve/adults/health-care/mental-health/resources/crisis-contacts.jsp    National Crisis Information:   Crisis Text Line: Text  [place]  to 545125  Suicide & Crisis Lifeline: 988  National Suicide Prevention Lifeline: 8-694-871-TALK (1-749.396.7160)       For online chat options, visit https://suicidepreventionlifeline.org/chat/  Poison Control Center: 0-924-002-3529  Trans Lifeline: 7-774-839-2505 - Hotline for transgender people of all ages  [employer]: 7-465-389-8599 - Hotline for LGBT youth     For Non-Emergency Support:   [employer]: Mental Health & Substance Use Disorder Resources -   https://www.[employer].org/
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
## 2023-03-14 ENCOUNTER — VIRTUAL VISIT (OUTPATIENT)
Dept: PSYCHIATRY | Facility: CLINIC | Age: 58
End: 2023-03-14
Attending: PSYCHIATRY & NEUROLOGY
Payer: COMMERCIAL

## 2023-03-14 DIAGNOSIS — F90.0 ATTENTION DEFICIT HYPERACTIVITY DISORDER (ADHD), PREDOMINANTLY INATTENTIVE TYPE: ICD-10-CM

## 2023-03-14 DIAGNOSIS — F41.1 GAD (GENERALIZED ANXIETY DISORDER): ICD-10-CM

## 2023-03-14 PROCEDURE — 99214 OFFICE O/P EST MOD 30 MIN: CPT | Mod: 95 | Performed by: PSYCHIATRY & NEUROLOGY

## 2023-03-14 PROCEDURE — 90833 PSYTX W PT W E/M 30 MIN: CPT | Mod: 95 | Performed by: PSYCHIATRY & NEUROLOGY

## 2023-03-14 RX ORDER — PAROXETINE 10 MG/1
10 TABLET, FILM COATED ORAL EVERY MORNING
Qty: 30 TABLET | Refills: 1 | Status: SHIPPED | OUTPATIENT
Start: 2023-03-14 | End: 2023-06-02

## 2023-03-14 RX ORDER — METHYLPHENIDATE HYDROCHLORIDE 20 MG/1
TABLET ORAL
Qty: 90 TABLET | Refills: 0 | Status: SHIPPED | OUTPATIENT
Start: 2023-03-14 | End: 2023-05-30

## 2023-03-14 RX ORDER — METHYLPHENIDATE HYDROCHLORIDE 20 MG/1
TABLET ORAL
Qty: 90 TABLET | Refills: 0 | Status: SHIPPED | OUTPATIENT
Start: 2023-03-14 | End: 2023-06-28

## 2023-03-14 RX ORDER — CLONAZEPAM 0.5 MG/1
0.5 TABLET ORAL
Qty: 90 TABLET | Refills: 2 | Status: SHIPPED | OUTPATIENT
Start: 2023-03-14 | End: 2024-01-02
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
## 2023-03-23 ENCOUNTER — MYC MEDICAL ADVICE (OUTPATIENT)
Dept: PSYCHIATRY | Facility: CLINIC | Age: 58
End: 2023-03-23

## 2023-03-23 NOTE — TELEPHONE ENCOUNTER
Received refills request for the Prozac 40 mg take 2  capsules by mouth daily.    Writer called [facility] pharmacy. They informed writer there's one more refill on file and they can dispense that when it's time. She will need future refills after this one.     Patient is scheduled to see Dr. Ricks on 4-, refills can be addressed then.    Anisa Rodarte on 3/23/2023 at 11:15 AM

## 2023-04-11 DIAGNOSIS — F41.1 GAD (GENERALIZED ANXIETY DISORDER): ICD-10-CM

## 2023-04-11 RX ORDER — PAROXETINE 10 MG/1
TABLET, FILM COATED ORAL
Qty: 30 TABLET | Refills: 1 | OUTPATIENT
Start: 2023-04-11

## 2023-05-30 DIAGNOSIS — F90.0 ATTENTION DEFICIT HYPERACTIVITY DISORDER (ADHD), PREDOMINANTLY INATTENTIVE TYPE: ICD-10-CM

## 2023-05-30 RX ORDER — METHYLPHENIDATE HYDROCHLORIDE 20 MG/1
TABLET ORAL
Qty: 90 TABLET | Refills: 0 | Status: SHIPPED | OUTPATIENT
Start: 2023-05-30 | End: 2023-06-07

## 2023-05-30 NOTE — TELEPHONE ENCOUNTER
[facility] Call [facility]    Phone Message    May a detailed message be left on voicemail: yes     Reason for Call: Other: Medication Refill      Patient is completely out of methylphenidate (RITALIN) 20mg and asked for a refill of clonazePAM (KLONOPIN) 0.5mg, patient does not know how much Klonopin she has left.    [facility] [id number] [place], [place] - [address]    Action Taken: Message routed to:  Other: [facility] Nurse pool    Travel Screening: Not Applicable

## 2023-05-30 NOTE — TELEPHONE ENCOUNTER
Last seen: 3/14  RTC: 4 weeks   Cancel: none   No-show: 4/25  Next appt: 6/27    Incoming refill from patient via phone      Disp Refills Start End AARTI   methylphenidate (RITALIN) 20 MG tablet 90 tablet 0 3/14/2023  No   Sig: Take 2 tabs in am and one tab at noon   Sent to pharmacy as: Methylphenidate HCl 20 MG Oral Tablet (RITALIN)   Class: E-Prescribe   Earliest Fill Date: 3/14/2023   Notes to Pharmacy: Do not fill until April 10, 2023   Order: [id number]   E-Prescribing Status: Receipt confirmed by pharmacy (3/14/2023  2:13 PM CDT)   No prior authorization was found for this prescription.   Found prior authorization for another prescription for the same medication: Approved   Per  last refilled: 4/22 #90, 3/14 #90, 2/7 #90     Disp Refills Start End AARTI   clonazePAM (KLONOPIN) 0.5 MG tablet 90 tablet 2 3/14/2023  No   Sig - Route: Take 1 tablet (0.5 mg) by mouth nightly as needed for anxiety - Oral   Sent to pharmacy as: clonazePAM 0.5 MG Oral Tablet (klonoPIN)   Class: E-Prescribe   Order: [id number]   E-Prescribing Status: Receipt confirmed by pharmacy (3/14/2023  2:13 PM CDT)   Per  last refilled: 3/14 #90, 7/5 #90  - Per chart review patient has refills on file at the pharmacy     Will reach out to provider for approval to refill Ritalin.

## 2023-05-31 DIAGNOSIS — F41.1 GAD (GENERALIZED ANXIETY DISORDER): ICD-10-CM

## 2023-06-02 RX ORDER — PAROXETINE 10 MG/1
10 TABLET, FILM COATED ORAL EVERY MORNING
Qty: 30 TABLET | Refills: 0 | Status: SHIPPED | OUTPATIENT
Start: 2023-06-02 | End: 2023-06-27

## 2023-06-05 DIAGNOSIS — F41.1 GAD (GENERALIZED ANXIETY DISORDER): ICD-10-CM

## 2023-06-05 DIAGNOSIS — F90.0 ATTENTION DEFICIT HYPERACTIVITY DISORDER (ADHD), PREDOMINANTLY INATTENTIVE TYPE: ICD-10-CM

## 2023-06-05 RX ORDER — BUSPIRONE HYDROCHLORIDE 10 MG/1
TABLET ORAL
Qty: 630 TABLET | Refills: 1 | Status: SHIPPED | OUTPATIENT
Start: 2023-06-05 | End: 2023-10-03

## 2023-06-05 RX ORDER — FLUOXETINE 40 MG/1
80 CAPSULE ORAL DAILY
Qty: 60 CAPSULE | Refills: 3 | Status: CANCELLED | OUTPATIENT
Start: 2023-06-05

## 2023-06-05 NOTE — TELEPHONE ENCOUNTER
Last Seen: 3-  RTC: 4 weeks   Cancel: none   No-Show: yes   Next Appt: 6-    Incoming Refill From [facility] pharmacy  via faxed    Medication Requested: busPIRone (BUSPAR) 10 MG tablet  Directions: Sig: Take 4 in am and 3 at night.  Qty:  90  Last Refill: 3-       We also received refills for the Prozac. Patient states she is not taking the Prozac anymore . They have switched over to Paxil. Writer discontinued Prozac .Writer also  reviewed refills with patient. Writer informed patient that she have refills on file for the Clonazepam and Remeron at the pharmacy. We just sent over the Prazosin and the Ritalin . There's no refill for the Buspar and will send this to Dr. Ricks for review.        Medication Refill pended  Per Refill Protocol    Anisa Rodarte on 6/5/2023 at 1:50 PM

## 2023-06-07 DIAGNOSIS — F90.0 ATTENTION DEFICIT HYPERACTIVITY DISORDER (ADHD), PREDOMINANTLY INATTENTIVE TYPE: ICD-10-CM

## 2023-06-07 RX ORDER — METHYLPHENIDATE HYDROCHLORIDE 20 MG/1
TABLET ORAL
Qty: 45 TABLET | Refills: 0 | Status: SHIPPED | OUTPATIENT
Start: 2023-06-07 | End: 2023-06-28

## 2023-06-07 NOTE — TELEPHONE ENCOUNTER
Received request from pharmacy for the Ritalin 20 mg .   Message to prescriber :  Patient request new Rx. Due to supply issues and patient needs refill. Only filled #45/90 of methyphenidate 20 mg Rx. Please send for new Rx for Remainder .       Writer called pharmacy and spoke to Guy. He states that yesterday they filled #45, they are out of stock . They would like the remaining 45 tablets sent for approval.    Writer have pended for 45 tablets and notes to pharmacy ok to fill remaining             Anisa Rodarte on 6/7/2023 at 10:11 AM

## 2023-06-08 NOTE — TELEPHONE ENCOUNTER
Writer received another fax from pharmacy in regards to messages below. Writer called pharmacy and spoke to Raffi, pharmacist. He states he was not sure why it was faxed. They did received the script on 6-7-2023, for the remainder script .    Anisa Rodarte on 6/8/2023 at 11:32 AM

## 2023-06-27 ENCOUNTER — VIRTUAL VISIT (OUTPATIENT)
Dept: PSYCHIATRY | Facility: CLINIC | Age: 58
End: 2023-06-27
Attending: PSYCHIATRY & NEUROLOGY
Payer: COMMERCIAL

## 2023-06-27 DIAGNOSIS — F41.1 GAD (GENERALIZED ANXIETY DISORDER): ICD-10-CM

## 2023-06-27 DIAGNOSIS — F90.0 ATTENTION DEFICIT HYPERACTIVITY DISORDER (ADHD), PREDOMINANTLY INATTENTIVE TYPE: ICD-10-CM

## 2023-06-27 PROCEDURE — 90833 PSYTX W PT W E/M 30 MIN: CPT | Mod: 93 | Performed by: PSYCHIATRY & NEUROLOGY

## 2023-06-27 PROCEDURE — 99214 OFFICE O/P EST MOD 30 MIN: CPT | Mod: 95 | Performed by: PSYCHIATRY & NEUROLOGY
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
## 2023-06-27 NOTE — PATIENT INSTRUCTIONS
**For crisis resources, please see the information at the end of this document**   Patient Education    Thank you for coming to the [facility] [place] [facility].     Lab Testing:  If you had lab testing today and your results are reassuring or normal they will be mailed to you or sent through [employer] within 7 days. If the lab tests need quick action we will call you with the results. The phone number we will call with results is # [phone]. If this is not the best number please call our clinic and change the number.     Medication Refills:  If you need any refills please call your pharmacy and they will contact us. Our fax number for refills is [phone].   Three business days of notice are needed for general medication refill requests.   Five business days of notice are needed for controlled substance refill requests.   If you need to change to a different pharmacy, please contact the new pharmacy directly. The new pharmacy will help you get your medications transferred.     Contact Us:  Please call [phone] during business hours (8-5:00 M-F).   If you have medication related questions after clinic hours, or on the weekend, please call [phone].     Financial Assistance [phone]   Medical Records [phone]       MENTAL HEALTH CRISIS RESOURCES:  For a emergency help, please call 911 or go to the nearest Emergency Department.     Emergency Walk-In Options:   EmPATH Unit @ [facility] ([place]): [phone] - Specialized mental health emergency area designed to be calming  [facility] ([place]): [phone]  [facility] ([place]): [phone]  [facility]): [phone]    [facility] Crisis Information:   [place]: [phone]  [place]: [phone]  [place] (TOMMY) - Adult: [phone]     Child: [phone]  [place] - Adult: [phone]     Child: [phone]  [place]:  [phone]  List of all [facility] resources:   https://[place].gov/dhs/people-we-serve/adults/health-care/mental-health/resources/crisis-contacts.jsp    National Crisis Information:   Crisis Text Line: Text  [place]  to 993013  Suicide & Crisis Lifeline: 988  National Suicide Prevention Lifeline: 1-262-768-TALK (1-320.906.8576)       For online chat options, visit https://suicidepreventionlifeline.org/chat/  Poison Control Center: 4-805-560-6081  Trans Lifeline: 6-571-415-6680 - Hotline for transgender people of all ages  The [employer] Project: 8-979-414-5541 - Hotline for LGBT youth     For Non-Emergency Support:   Fast Tracker: Mental Health & Substance Use Disorder Resources -   https://www.[place].org/

## 2023-06-27 NOTE — NURSING NOTE
Is the patient currently in the state of [place]? YES    Visit mode:TELEPHONE    If the visit is dropped, the patient can be reconnected by: TELEPHONE VISIT: Phone number: [phone]    Will anyone else be joining the visit? NO      How would you like to obtain your AVS? MyChart    Are changes needed to the allergy or medication list? NO changes to medications per Rossi    Reason for visit: RECHPETE [facility]

## 2023-06-28 RX ORDER — MIRTAZAPINE 45 MG/1
45 TABLET, FILM COATED ORAL AT BEDTIME
Qty: 90 TABLET | Refills: 1 | Status: SHIPPED | OUTPATIENT
Start: 2023-06-28 | End: 2024-01-02

## 2023-06-28 RX ORDER — PRAMIPEXOLE DIHYDROCHLORIDE 0.25 MG/1
TABLET ORAL
Qty: 60 TABLET | Refills: 1 | Status: SHIPPED | OUTPATIENT
Start: 2023-06-28 | End: 2023-08-01

## 2023-06-28 RX ORDER — METHYLPHENIDATE HYDROCHLORIDE 20 MG/1
TABLET ORAL
Qty: 90 TABLET | Refills: 0 | Status: SHIPPED | OUTPATIENT
Start: 2023-06-28 | End: 2023-08-01

## 2023-06-28 RX ORDER — TRAZODONE HYDROCHLORIDE 100 MG/1
100 TABLET ORAL
Qty: 30 TABLET | Refills: 1 | Status: SHIPPED | OUTPATIENT
Start: 2023-06-28 | End: 2023-08-01

## 2023-06-28 RX ORDER — METHYLPHENIDATE HYDROCHLORIDE 20 MG/1
TABLET ORAL
Qty: 45 TABLET | Refills: 0 | Status: SHIPPED | OUTPATIENT
Start: 2023-06-28 | End: 2023-08-01

## 2023-06-28 RX ORDER — PAROXETINE 20 MG/1
20 TABLET, FILM COATED ORAL EVERY MORNING
Qty: 30 TABLET | Refills: 1 | Status: SHIPPED | OUTPATIENT
Start: 2023-06-28 | End: 2023-08-01

## 2023-07-01 ENCOUNTER — HEALTH MAINTENANCE LETTER (OUTPATIENT)
Age: 58
End: 2023-07-01

## 2023-07-27 ENCOUNTER — TELEPHONE (OUTPATIENT)
Dept: PSYCHIATRY | Facility: CLINIC | Age: 58
End: 2023-07-27
Payer: COMMERCIAL

## 2023-07-27 NOTE — TELEPHONE ENCOUNTER
Dr. Ricks,    This patient will see you on 8-1-2023. Pharmacy is requesting 90 days supply on the Pramipexole 0.25 mg  qty 180    Please let me know if you want to send in for 90 days refill now or wait until her next appointment .     Send it back to me if you want 90 days now and I will pend it for you .      Anisa Rodarte on 7/27/2023 at 6:34 AM

## 2023-08-01 ENCOUNTER — VIRTUAL VISIT (OUTPATIENT)
Dept: PSYCHIATRY | Facility: CLINIC | Age: 58
End: 2023-08-01
Attending: PSYCHIATRY & NEUROLOGY
Payer: COMMERCIAL

## 2023-08-01 DIAGNOSIS — F41.1 GAD (GENERALIZED ANXIETY DISORDER): ICD-10-CM

## 2023-08-01 DIAGNOSIS — F90.0 ATTENTION DEFICIT HYPERACTIVITY DISORDER (ADHD), PREDOMINANTLY INATTENTIVE TYPE: ICD-10-CM

## 2023-08-01 PROCEDURE — 99214 OFFICE O/P EST MOD 30 MIN: CPT | Mod: 95 | Performed by: PSYCHIATRY & NEUROLOGY

## 2023-08-01 PROCEDURE — 90833 PSYTX W PT W E/M 30 MIN: CPT | Mod: 95 | Performed by: PSYCHIATRY & NEUROLOGY

## 2023-08-01 RX ORDER — METHYLPHENIDATE HYDROCHLORIDE 20 MG/1
TABLET ORAL
Qty: 90 TABLET | Refills: 0 | Status: SHIPPED | OUTPATIENT
Start: 2023-08-01 | End: 2023-10-03

## 2023-08-01 RX ORDER — TRAZODONE HYDROCHLORIDE 100 MG/1
100 TABLET ORAL
Qty: 90 TABLET | Refills: 1 | Status: SHIPPED | OUTPATIENT
Start: 2023-08-01 | End: 2023-10-03

## 2023-08-01 RX ORDER — PAROXETINE 40 MG/1
40 TABLET, FILM COATED ORAL EVERY MORNING
Qty: 30 TABLET | Refills: 2 | Status: SHIPPED | OUTPATIENT
Start: 2023-08-01 | End: 2023-10-03

## 2023-08-01 ASSESSMENT — PATIENT HEALTH QUESTIONNAIRE - PHQ9
SUM OF ALL RESPONSES TO PHQ QUESTIONS 1-9: 5
10. IF YOU CHECKED OFF ANY PROBLEMS, HOW DIFFICULT HAVE THESE PROBLEMS MADE IT FOR YOU TO DO YOUR WORK, TAKE CARE OF THINGS AT HOME, OR GET ALONG WITH OTHER PEOPLE: EXTREMELY DIFFICULT
SUM OF ALL RESPONSES TO PHQ QUESTIONS 1-9: 5

## 2023-08-01 NOTE — PATIENT INSTRUCTIONS
**For crisis resources, please see the information at the end of this document**   Patient Education    Thank you for coming to the [facility]DI[facility] [place] CL[facility].     Lab Testing:  If you had lab testing today and your results are reassuring or normal they will be mailed to you or sent through [employer] within 7 days. If the lab tests need quick action we will call you with the results. The phone number we will call with results is # [phone]. If this is not the best number please call our clinic and change the number.     Medication Refills:  If you need any refills please call your pharmacy and they will contact us. Our fax number for refills is [phone].   Three business days of notice are needed for general medication refill requests.   Five business days of notice are needed for controlled substance refill requests.   If you need to change to a different pharmacy, please contact the new pharmacy directly. The new pharmacy will help you get your medications transferred.     Contact Us:  Please call [phone] during business hours (8-5:00 M-F).   If you have medication related questions after clinic hours, or on the weekend, please call [phone].     Financial Assistance [phone]   Medical Records [phone]       MENTAL HEALTH CRISIS RESOURCES:  For a emergency help, please call 911 or go to the nearest Emergency Department.     Emergency Walk-In Options:   EmPATH Unit @ [facility] ([place]): [phone] - Specialized mental health emergency area designed to be calming  [facility] ([place]): [phone]  [facility] ([place]): [phone]  [facility]): [phone]    [facility] Crisis Information:   [place]: [phone]  [place]: [phone]  [place] (TOM[place]) - Adult: [phone]     Child: [phone]  [place] - Adult: [phone]     Child: [phone]  [place]:  [phone]  List of all [facility] resources:   https://[place].gov/dhs/people-we-serve/adults/health-care/mental-health/resources/crisis-contacts.jsp    National Crisis Information:   Crisis Text Line: Text  [place]  to 308503  Suicide & Crisis Lifeline: 988  National Suicide Prevention Lifeline: 1-174-764-TALK (1-252.437.4012)       For online chat options, visit https://suicidepreventionlifeline.org/chat/  Poison Control Center: 5-983-930-4815  Trans Lifeline: 8-137-252-8266 - Hotline for transgender people of all ages  The [employer] Project: 4-183-287-5194 - Hotline for LGBT youth     For Non-Emergency Support:   Fast Tracker: Mental Health & Substance Use Disorder Resources -   https://www.[place].org/
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
## 2023-08-01 NOTE — NURSING NOTE
Is the patient currently in the state of [place]? YES    Visit mode:TELEPHONE    If the visit is dropped, the patient can be reconnected by: TELEPHONE VISIT: Phone number: [phone]    Will anyone else be joining the visit? NO      How would you like to obtain your AVS? MyChart    Are changes needed to the allergy or medication list? NO    Reason for visit: RECHECK

## 2023-10-03 ENCOUNTER — VIRTUAL VISIT (OUTPATIENT)
Dept: PSYCHIATRY | Facility: CLINIC | Age: 58
End: 2023-10-03
Attending: PSYCHIATRY & NEUROLOGY
Payer: COMMERCIAL

## 2023-10-03 DIAGNOSIS — F90.0 ATTENTION DEFICIT HYPERACTIVITY DISORDER (ADHD), PREDOMINANTLY INATTENTIVE TYPE: ICD-10-CM

## 2023-10-03 DIAGNOSIS — F41.1 GAD (GENERALIZED ANXIETY DISORDER): ICD-10-CM

## 2023-10-03 PROCEDURE — 99214 OFFICE O/P EST MOD 30 MIN: CPT | Mod: 95 | Performed by: PSYCHIATRY & NEUROLOGY

## 2023-10-03 PROCEDURE — 90833 PSYTX W PT W E/M 30 MIN: CPT | Mod: 95 | Performed by: PSYCHIATRY & NEUROLOGY

## 2023-10-03 RX ORDER — BUSPIRONE HYDROCHLORIDE 10 MG/1
TABLET ORAL
Qty: 630 TABLET | Refills: 1 | Status: SHIPPED | OUTPATIENT
Start: 2023-10-03 | End: 2024-05-07

## 2023-10-03 RX ORDER — ROPINIROLE 0.5 MG/1
1 TABLET, FILM COATED ORAL AT BEDTIME
COMMUNITY
Start: 2023-09-26

## 2023-10-03 RX ORDER — METHYLPHENIDATE HYDROCHLORIDE 20 MG/1
TABLET ORAL
Qty: 90 TABLET | Refills: 0 | Status: SHIPPED | OUTPATIENT
Start: 2023-10-03 | End: 2024-01-02

## 2023-10-03 RX ORDER — PAROXETINE 20 MG/1
60 TABLET, FILM COATED ORAL EVERY MORNING
Qty: 90 TABLET | Refills: 2 | Status: SHIPPED | OUTPATIENT
Start: 2023-10-03 | End: 2023-10-13

## 2023-10-03 ASSESSMENT — PAIN SCALES - GENERAL: PAINLEVEL: SEVERE PAIN (7)

## 2023-10-03 NOTE — PATIENT INSTRUCTIONS
**For crisis resources, please see the information at the end of this document**   Patient Education    Thank you for coming to the [facility] [place] [facility].     Lab Testing:  If you had lab testing today and your results are reassuring or normal they will be mailed to you or sent through [employer] within 7 days. If the lab tests need quick action we will call you with the results. The phone number we will call with results is # [phone]. If this is not the best number please call our clinic and change the number.     Medication Refills:  If you need any refills please call your pharmacy and they will contact us. Our fax number for refills is [phone].   Three business days of notice are needed for general medication refill requests.   Five business days of notice are needed for controlled substance refill requests.   If you need to change to a different pharmacy, please contact the new pharmacy directly. The new pharmacy will help you get your medications transferred.     Contact Us:  Please call [phone] during business hours (8-5:00 M-F).   If you have medication related questions after clinic hours, or on the weekend, please call [phone].     Financial Assistance [phone]   Medical Records [phone]       MENTAL HEALTH CRISIS RESOURCES:  For a emergency help, please call 911 or go to the nearest Emergency Department.     Emergency Walk-In Options:   Em[facility] Unit @ [facility] ([place]): [phone] - Specialized mental health emergency area designed to be calming  [facility] ([place]): [phone]  [facility] ([place]): [phone]  [facility]): [phone]    [facility] Crisis Information:   [place]: [phone]  [place]: [phone]  [place] ([place]) - Adult: [phone]     Child: [phone]  [place] - Adult: [phone]     Child: [phone]  [place]:  [phone]  List of all [facility] resources:   https://[place].gov/dhs/people-we-serve/adults/health-care/mental-health/resources/crisis-contacts.jsp    National Crisis Information:   Crisis Text Line: Text  [place]  to 268555  Suicide & Crisis Lifeline: 988  National Suicide Prevention Lifeline: 0-960-856-TALK (1-844.848.5189)       For online chat options, visit https://suicidepreventionlifeline.org/chat/  Poison Control Center: 5-294-894-5583  Trans Lifeline: 6-638-733-6264 - Hotline for transgender people of all ages  [employer]: 5-099-355-5739 - Hotline for LGBT youth     For Non-Emergency Support:   [employer]: Mental Health & Substance Use Disorder Resources -   https://www.[employer].org/

## 2023-10-03 NOTE — NURSING NOTE
Is the patient currently in the state of [place]? YES    Visit mode:TELEPHONE    If the visit is dropped, the patient can be reconnected by: TELEPHONE VISIT: Phone number:   Telephone Information:   Mobile [phone]       Will anyone else be joining the visit? NO  (If patient encounters technical issues they should call [phone])    How would you like to obtain your AVS? MyChart    Are changes needed to the allergy or medication list? No    Reason for visit: RECHECK    Declined qnrs.    Elizabeth LUCIANO
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
## 2023-10-05 DIAGNOSIS — F41.1 GENERALIZED ANXIETY DISORDER: ICD-10-CM

## 2023-10-05 NOTE — TELEPHONE ENCOUNTER
Received PA for the Paroxetine 20 mg , take 3 tablets by mouth every morning.       Writer called pharmacy. Spoke to Beverly. She said that insurance only covers one tablet per day.  Patient is taking 3 tablets.      Dr. Ricks,    Do you want to proceed with PA or change to 20 mg and 40 mg ?      Anisa Rodarte on 10/5/2023 at 9:24 AM

## 2023-10-06 RX ORDER — PAROXETINE 20 MG/1
20 TABLET, FILM COATED ORAL EVERY MORNING
Qty: 30 TABLET | Refills: 0 | Status: CANCELLED | OUTPATIENT
Start: 2023-10-06

## 2023-10-06 RX ORDER — PAROXETINE 40 MG/1
40 TABLET, FILM COATED ORAL EVERY MORNING
Qty: 30 TABLET | Refills: 0 | Status: CANCELLED | OUTPATIENT
Start: 2023-10-06

## 2023-10-09 NOTE — TELEPHONE ENCOUNTER
Writer called and left message to return or check my chart.    Anisa Rodarte on 10/9/2023 at 1:43 PM

## 2023-10-13 RX ORDER — PAROXETINE 40 MG/1
40 TABLET, FILM COATED ORAL EVERY MORNING
Qty: 30 TABLET | Refills: 0 | Status: SHIPPED | OUTPATIENT
Start: 2023-10-13 | End: 2023-11-15

## 2023-10-13 RX ORDER — PAROXETINE 20 MG/1
20 TABLET, FILM COATED ORAL EVERY MORNING
Qty: 30 TABLET | Refills: 0 | Status: SHIPPED | OUTPATIENT
Start: 2023-10-13 | End: 2023-11-15

## 2023-10-19 NOTE — TELEPHONE ENCOUNTER
Writer followed up with patient. Patient clarified that the pharmacy she used is [facility], not [facility]. She have not picked up her prescriptions. Writer also informed patient that we have changed the Paxil to two different strength due to insurance. Patient informed writer that she have not even pick them up . Writer informed patient to check her bottles when she pick her prescription at the pharmacy.  Patient states understanding.    Writer also called [facility] on [address] and informed pharmacist to disregard refills , patient is using [facility].      Anisa Rodarte on 10/19/2023 at 9:18 AM

## 2023-11-15 DIAGNOSIS — F41.1 GENERALIZED ANXIETY DISORDER: ICD-10-CM

## 2023-11-15 RX ORDER — PAROXETINE 40 MG/1
TABLET, FILM COATED ORAL
Qty: 30 TABLET | Refills: 0 | Status: SHIPPED | OUTPATIENT
Start: 2023-11-15 | End: 2023-12-19

## 2023-11-15 RX ORDER — PAROXETINE 20 MG/1
TABLET, FILM COATED ORAL
Qty: 30 TABLET | Refills: 0 | Status: SHIPPED | OUTPATIENT
Start: 2023-11-15 | End: 2023-12-19

## 2023-11-15 NOTE — TELEPHONE ENCOUNTER
Medication requested:  PAROXETINE 40MG TABLETS    Last refilled: 10/13/23  Qty: 30/0     Medication requested:  PARoxetine (PAXIL) 20 MG tablet   Last refilled: 10/13/23  Qty: 30/0      Last seen: 10/3/23 Increase Paxil to 60 mg/day   RTC: 4-8 weeks  Cancel: 0  No-show: 0  Next appt: 1/2/24    Refill decision:   Refilled for 30 days per protocol.       Subjective:    Pt is seen today in consult from Kristen gutiérrez/ tamara c/c of red swollen right big toe.  States he had a blister on his right toe for 1 month.  Recently become more red and swollen.  Has not really had any pain.  He denies numbness or tingling in his feet.  He is concerned because he has diabetes on Coumadin for chronic atrial fib.  He denies any purulence or odor.  He denies any fever or chills.  He has had a slight amount of drainage.    ROS:  A 10-point review of systems was performed and is positive for that noted in the HPI and as seen below.  All other areas are negative.     Past Medical History:   Diagnosis Date     AF (atrial fibrillation) (H)      CHF (congestive heart failure), NYHA class II (H)      CKD (chronic kidney disease) stage 2, GFR 60-89 ml/min 2018     COPD (chronic obstructive pulmonary disease) (H)      Diabetes mellitus, type 2 (H) 4/22/2015     Diabetic neuropathy (H)      Elevated alkaline phosphatase level      Fracture, scapula      HDL deficiency 4/10/2013     Hepatitis C      HTN (hypertension)      Hyperlipidemia      Morbid obesity (H)      NAFLD (nonalcoholic fatty liver disease)      Polycythemia secondary to smoking      Varicose veins of legs 8/8/2018       Past Surgical History:   Procedure Laterality Date     ANGIOGRAM  8/2006    normal     TONSILLECTOMY & ADENOIDECTOMY         Family History   Problem Relation Age of Onset     Diabetes Mother      Cerebrovascular Disease Father      Heart Disease Brother         pacer      Cancer No family hx of      Glaucoma No family hx of      Macular Degeneration No family hx of        Social History     Tobacco Use     Smoking status: Current Every Day Smoker     Packs/day: 1.00     Years: 45.00     Pack years: 45.00     Types: Cigarettes     Smokeless tobacco: Never Used     Tobacco comment: cut down to 0.5 ppd    Substance Use Topics     Alcohol use: Yes     Alcohol/week: 0.0 - 4.2 standard drinks         Current Outpatient  Medications:      albuterol (2.5 MG/3ML) 0.083% neb solution, Take 1 vial (2.5 mg) by nebulization every 4 hours as needed for shortness of breath / dyspnea, Disp: , Rfl:      albuterol (PROAIR HFA/PROVENTIL HFA/VENTOLIN HFA) 108 (90 Base) MCG/ACT Inhaler, Inhale 1-2 puffs into the lungs every 4 hours as needed for shortness of breath / dyspnea, Disp: 1 Inhaler, Rfl: 11     amoxicillin-clavulanate (AUGMENTIN) 875-125 MG tablet, Take 1 tablet by mouth 2 times daily, Disp: 20 tablet, Rfl: 0     ASPIRIN NOT PRESCRIBED, INTENTIONAL,, Antiplatelet medication not prescribed intentionally due to Current anticoagulant therapy (warfarin/enoxaparin), Disp: , Rfl:      atorvastatin (LIPITOR) 20 MG tablet, Take 1 tablet (20 mg) by mouth daily, Disp: 90 tablet, Rfl: 3     digoxin (DIGOX) 125 MCG tablet, Take 1 tablet (125 mcg) by mouth daily, Disp: 90 tablet, Rfl: 3     dimenhyDRINATE (DRAMAMINE PO), Take by mouth as needed, Disp: , Rfl:      empagliflozin (JARDIANCE) 10 MG TABS tablet, Take 1 tablet (10 mg) by mouth daily, Disp: 90 tablet, Rfl: 3     furosemide (LASIX) 20 MG tablet, Take 1 tablet (20 mg) by mouth 1 times daily, Disp: 90 tablet, Rfl: 1     glipiZIDE (GLUCOTROL) 5 MG tablet, TAKE 1 TABLET BY MOUTH TWICE DAILY BEFORE MEAL(S), Disp: 180 tablet, Rfl: 0     ketoconazole (NIZORAL) 2 % external cream, Apply topically daily for 14 days, Disp: 30 g, Rfl: 0     lisinopril (ZESTRIL) 40 MG tablet, Take 1 tablet by mouth once daily, Disp: 90 tablet, Rfl: 1     metFORMIN (GLUCOPHAGE) 1000 MG tablet, Take 1 tablet (1,000 mg) by mouth 2 times daily (with meals), Disp: 180 tablet, Rfl: 3     metoprolol tartrate (LOPRESSOR) 100 MG tablet, Take 2 tablets (200 mg) by mouth 2 times daily, Disp: 360 tablet, Rfl: 3     tiotropium (SPIRIVA HANDIHALER) 18 MCG inhaled capsule, INHALE 1 CAPSULE (18 MCG) INTO THE LUNGS DAILY, Disp: 90 capsule, Rfl: 3     warfarin ANTICOAGULANT (COUMADIN) 2.5 MG tablet, TAKE 2 TABLETS (5MG) BY MOUTH ON TUES  AND SAT. TAKE 3 TABLETS (7.5MG) ALL OTHER DAYS OR AS DIRECTED BY INR CLINIC, Disp: 240 tablet, Rfl: 1       Allergies   Allergen Reactions     Nkda [No Known Drug Allergies]        BP (!) 138/95   Pulse 96   Wt 99.3 kg (219 lb)   BMI 33.30 kg/m  .      Constitutional/ general:  Pt is in no apparent distress, appears well-nourished.  Cooperative with history and physical exam.  Seen with his wife    Psych:  The patient answered questions appropriately.  Normal affect.  Seems to have reasonable expectations, in terms of treatment.     Eyes:  Visual scanning/ tracking without deficit.     Ears:  Response to auditory stimuli is normal.   Auricles in proper alignment.     Lymphatic:  Popliteal lymph nodes not enlarged.     Lungs:  Non labored breathing, non labored speech. No cough.  No audible wheezing. Even, quiet breathing.       Vascular:  Pedal pulses are palpable bilaterally for both the DP.  Patient has ankle edema and varicosities difficult to palpate his tibial arteries.     Neuro:  Alert and oriented x 3. Coordinated gait.  Light touch sensation is intact to the L4, L5, S1 distributions. No obvious deficits.  No evidence of neurological-based weakness, spasticity, or contracture in the lower extremities.  Monofilament intact in the digits.    Derm: Thin and shiny with no hair growth noted    Musculoskeletal:     Patient is ambulatory without an assistive device or brace.  No gross deformities.  Normal arch with weightbearing.  No forefoot or rear foot deformities noted.  MS 5/5 all compartments.  Normal ROM all fore foot and rearfoot joints.  No equinus.  right great toe nail lateral border shows soft tissue impingement with localized erythema.  There is also erythema proximal nail border but most acute lateral side negative active drainage/purulence at this time.  No sinus tracts.  No nailbed masses or exostosis.  No pain with range of motion of IPJ or MTPJ.  No ascending  cellulitis.    ASSESSMENT:    Onychocryptosis with paronychia right toe.  Diabetes     Discussed etiology and treatment options in detail w/ the pt.  The potential causes and nature of an ingrown toenail were discussed with the patient.  We reviewed the natural history/prognosis of the condition and potential risks if no treatment is provided.      Treatment options discussed included conservative management (oral antibiotics, soaking of foot, adequate width shoes)  as well as surgical management (partial or total nail removal).  The pros and cons of both forms of treatment were reviewed.  Handout given to patient.      After thorough discussion and answering all questions, the patient elected to have nail avulsion.  Obtained consent, used 3cc of 1% lidocaine plain to block right first toe.  Sterile prep, then avulsed the affected border(s).  No evidence of deep abscess noted.  Pt tolerated procedure well.  Sterile bandage placed, gave wound care instruction.  Patient instructed to keep nails trimmed shorter and to make sure shoes are not too tight.  Return to clinic prn.  Thank you for allowing me participate in the care of this patient.        Keyon Carlson, REEMA DPWONG, FACFAS

## 2023-12-12 NOTE — TELEPHONE ENCOUNTER
Medication requested: PAROXETINE HCL 10 MG TABLET  Last refilled: 3/14/23  Qty: 30/1      Last seen: 3/14/23  RTC: 4 weeks  Cancel: 0  No-show: x 1 on 4/25/23  Next appt: 6/27/23    Refill decision:   Refill pended and routed to the provider for review/determination due to   NO SHOW X 1  SCHEDULED FOR Follow-up 6/27/23       S-(situation): Deep cough that is lingering for over a week.    B-(background):  Had a bad chest cold about 2 weeks ago.     A-(assessment): Burning with cough. Wheezing/crackling when exhaling. Not coughing anything up. Taking Robitussin DM. No fever.    R-(recommendations): appointment made for 12/13. Also offered home care advice per protocol.     Reason for Disposition   Cough and there is no fever   Continuous (nonstop) coughing interferes with work or school and no improvement using cough treatment per Care Advice    Protocols used: Influenza - Seasonal-A-[place], Cough-A-[place]    GRISELDA HOLBROOK RN on 12/12/2023 at 10:31 AM

## 2023-12-16 DIAGNOSIS — F41.1 GENERALIZED ANXIETY DISORDER: ICD-10-CM

## 2023-12-19 RX ORDER — PAROXETINE 20 MG/1
20 TABLET, FILM COATED ORAL EVERY MORNING
Qty: 30 TABLET | Refills: 0 | Status: SHIPPED | OUTPATIENT
Start: 2023-12-19 | End: 2024-01-02

## 2023-12-19 RX ORDER — PAROXETINE 40 MG/1
40 TABLET, FILM COATED ORAL EVERY MORNING
Qty: 30 TABLET | Refills: 0 | Status: SHIPPED | OUTPATIENT
Start: 2023-12-19 | End: 2024-01-02

## 2023-12-19 NOTE — TELEPHONE ENCOUNTER
Medication requested: PARoxetine (PAXIL) 40 MG tablet  Last refilled: 11/15/2023  Qty: 30/0     Medication requested: PARoxetine (PAXIL) 20 MG tablet  Last refilled: 11/15/2023  Qty: 30/0    Last seen: 10/3/2023  [facility]     Amy Ricks MD     RTC:   4-8 weeks   Cancel: 0   No-show: 0   Next appt: 1/2/24     Refill decision: Refilled for 30 days per protocol.    Warnings Override History for mirtazapine (REMERON) 45 MG tablet [[id number]]    Overridden by Tanika Wolff RN on Nov 15, 2023 11:40 AM   Drug-Drug   1. SELECTIVE SEROTONIN REUPTAKE INHIBITORS / SEROTONERGIC NON-OPIOID CNS DEPRESSANTS [Level: Major]   Other Orders: PARoxetine (PAXIL) 20 MG tablet      2. SELECTIVE SEROTONIN REUPTAKE INHIBITORS / SEROTONERGIC NON-OPIOID CNS DEPRESSANTS [Level: Major]   Other Orders: PARoxetine (PAXIL) 40 MG tablet         Overridden by Mateus Doshi MD on Oct 13, 2023 12:55 PM   Drug-Drug   1. SELECTIVE SEROTONIN REUPTAKE INHIBITORS / SEROTONERGIC NON-OPIOID CNS DEPRESSANTS [Level: Major]   Other Orders: PARoxetine (PAXIL) 20 MG tablet      2. SELECTIVE SEROTONIN REUPTAKE INHIBITORS / SEROTONERGIC NON-OPIOID CNS DEPRESSANTS [Level: Major]   Other Orders: PARoxetine (PAXIL) 40 MG tablet         Overridden by Amy Ricks MD on Oct 3, 2023 2:05 PM   Drug-Drug   1. SELECTIVE SEROTONIN REUPTAKE INHIBITORS / SEROTONERGIC NON-OPIOID CNS DEPRESSANTS [Level: Major]   Other Orders: PARoxetine (PAXIL) 20 MG tablet

## 2024-01-02 ENCOUNTER — VIRTUAL VISIT (OUTPATIENT)
Dept: PSYCHIATRY | Facility: CLINIC | Age: 59
End: 2024-01-02
Attending: PSYCHIATRY & NEUROLOGY
Payer: COMMERCIAL

## 2024-01-02 DIAGNOSIS — F90.0 ATTENTION DEFICIT HYPERACTIVITY DISORDER (ADHD), PREDOMINANTLY INATTENTIVE TYPE: ICD-10-CM

## 2024-01-02 DIAGNOSIS — F41.1 GAD (GENERALIZED ANXIETY DISORDER): ICD-10-CM

## 2024-01-02 DIAGNOSIS — F41.1 GENERALIZED ANXIETY DISORDER: ICD-10-CM

## 2024-01-02 PROCEDURE — 99214 OFFICE O/P EST MOD 30 MIN: CPT | Mod: 93 | Performed by: PSYCHIATRY & NEUROLOGY

## 2024-01-02 PROCEDURE — 90833 PSYTX W PT W E/M 30 MIN: CPT | Mod: 93 | Performed by: PSYCHIATRY & NEUROLOGY

## 2024-01-02 RX ORDER — PAROXETINE 20 MG/1
20 TABLET, FILM COATED ORAL EVERY MORNING
Qty: 90 TABLET | Refills: 3 | Status: SHIPPED | OUTPATIENT
Start: 2024-01-02 | End: 2024-04-19

## 2024-01-02 RX ORDER — METHYLPHENIDATE HYDROCHLORIDE 20 MG/1
TABLET ORAL
Qty: 90 TABLET | Refills: 0 | Status: SHIPPED | OUTPATIENT
Start: 2024-01-02 | End: 2024-05-07

## 2024-01-02 RX ORDER — METHYLPHENIDATE HYDROCHLORIDE 20 MG/1
TABLET ORAL
Qty: 90 TABLET | Refills: 0 | Status: SHIPPED | OUTPATIENT
Start: 2024-01-02 | End: 2024-02-23

## 2024-01-02 RX ORDER — MIRTAZAPINE 45 MG/1
45 TABLET, FILM COATED ORAL AT BEDTIME
Qty: 90 TABLET | Refills: 1 | Status: SHIPPED | OUTPATIENT
Start: 2024-01-02 | End: 2024-05-07

## 2024-01-02 RX ORDER — CLONAZEPAM 0.5 MG/1
0.5 TABLET ORAL
Qty: 90 TABLET | Refills: 2 | Status: SHIPPED | OUTPATIENT
Start: 2024-01-02 | End: 2024-05-07

## 2024-01-02 RX ORDER — PAROXETINE 40 MG/1
40 TABLET, FILM COATED ORAL EVERY MORNING
Qty: 90 TABLET | Refills: 3 | Status: SHIPPED | OUTPATIENT
Start: 2024-01-02 | End: 2024-04-19

## 2024-01-02 ASSESSMENT — PAIN SCALES - GENERAL: PAINLEVEL: NO PAIN (0)

## 2024-01-02 NOTE — PATIENT INSTRUCTIONS
**For crisis resources, please see the information at the end of this document**   Patient Education    Thank you for coming to the [facility] [place] [facility].     Lab Testing:  If you had lab testing today and your results are reassuring or normal they will be mailed to you or sent through [employer] within 7 days. If the lab tests need quick action we will call you with the results. The phone number we will call with results is # [phone]. If this is not the best number please call our clinic and change the number.     Medication Refills:  If you need any refills please call your pharmacy and they will contact us. Our fax number for refills is [phone].   Three business days of notice are needed for general medication refill requests.   Five business days of notice are needed for controlled substance refill requests.   If you need to change to a different pharmacy, please contact the new pharmacy directly. The new pharmacy will help you get your medications transferred.     Contact Us:  Please call [phone] during business hours (8-5:00 M-F).   If you have medication related questions after clinic hours, or on the weekend, please call [phone].     Financial Assistance [phone]   Medical Records [phone]       MENTAL HEALTH CRISIS RESOURCES:  For a emergency help, please call 911 or go to the nearest Emergency Department.     Emergency Walk-In Options:   Em[facility] Unit @ [facility] ([place]): [phone] - Specialized mental health emergency area designed to be calming  [facility] ED [facility] ([place]): [phone]  [facility] ([place]): [phone]  [facility]): [phone]    [facility] Crisis Information:   [place]: [phone]  [place]: [phone]  [place] ([place]OM[place]) - Adult: [phone]     Child: [phone]  [place] - Adult: [phone]     Child: [phone]  [place]:  [phone]  List of all [facility] resources:   https://[place].gov/dhs/people-we-serve/adults/health-care/mental-health/resources/crisis-contacts.jsp    National Crisis Information:   Crisis Text Line: Text  [place]  to 569157  Suicide & Crisis Lifeline: 988  National Suicide Prevention Lifeline: 7-589-590-TALK (1-214.960.2200)       For online chat options, visit https://suicidepreventionlifeline.org/chat/  Poison Control Center: 5-605-391-1470  Trans Lifeline: 8-171-630-8278 - Hotline for transgender people of all ages  [employer]: 0-515-150-6677 - Hotline for LGBT youth     For Non-Emergency Support:   Fast Tracker: Mental Health & Substance Use Disorder Resources -   https://www.[employer].org/
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
## 2024-01-02 NOTE — NURSING NOTE
Is the patient currently in the state of [place]? YES    Visit mode:TELEPHONE    If the visit is dropped, the patient can be reconnected by: TELEPHONE VISIT: Phone number:   Telephone Information:   Mobile [phone]       Will anyone else be joining the visit? NO  (If patient encounters technical issues they should call [phone])    How would you like to obtain your AVS? MyChart    Are changes needed to the allergy or medication list? Yes pt also started taking Trazodone 100 mg 1 tab po bedtime again. Please add to med list. Also, please remove duplicates that have been flagged for removal.    Reason for visit: RECHPETE LUCIANO

## 2024-02-14 DIAGNOSIS — F41.1 GAD (GENERALIZED ANXIETY DISORDER): Primary | ICD-10-CM

## 2024-02-14 DIAGNOSIS — F90.0 ATTENTION DEFICIT HYPERACTIVITY DISORDER (ADHD), PREDOMINANTLY INATTENTIVE TYPE: ICD-10-CM

## 2024-02-14 NOTE — TELEPHONE ENCOUNTER
Refills available for Paxil 40 mg and 20 mg, mirtazapine 45 mg, methylphenidate 20 mg, clonazepam 0.5 mg, and buspirone 10 mg at [facility].  90 d/s of clonazepam 0.5 mg last filled 1/8/24 and 30 d/s of methylphenidate 20 mg last filled 1/22/24; both too early to fill.      Per :

## 2024-02-14 NOTE — TELEPHONE ENCOUNTER
[facility] Call Center    Phone Message    May a detailed message be left on voicemail: yes     Reason for Call: Medication Refill Request    Has the patient contacted the pharmacy for the refill? Yes   Name of medication being requested: Ritalin, Paxil, Remeron, Klonopin, Buspar  Provider who prescribed the medication: Millicent  Pharmacy: [facility] [address] [place] [address] [address]tr[address], [place], [place] [id number] [phone]    (New preferred pharmacy, pt has been unable to get medications through [facility])    Date medication is needed: ASAP (pt is out)      Action Taken: Message routed to:  Other: Nursing Pool    Travel Screening: Not Applicable

## 2024-02-23 RX ORDER — TRAZODONE HYDROCHLORIDE 100 MG/1
100 TABLET ORAL AT BEDTIME
Qty: 90 TABLET | Refills: 0 | Status: SHIPPED | OUTPATIENT
Start: 2024-02-23 | End: 2024-05-07

## 2024-02-23 RX ORDER — METHYLPHENIDATE HYDROCHLORIDE 20 MG/1
TABLET ORAL
Qty: 90 TABLET | Refills: 0 | Status: SHIPPED | OUTPATIENT
Start: 2024-02-23 | End: 2024-04-04

## 2024-02-23 NOTE — TELEPHONE ENCOUNTER
Received incoming call from patient wanting to discuss refills about meds. Patient is requesting refills for Ritalin, Buspar, Trazodone and Remeron. Patient should have refills for all medications on file at the pharmacy except for Trazodone. Will reach out to provider for approval.     Reached out to [facility] and spoke with pharmacist who confirmed patient has refills on all medications except for Trazodone. She also shared that patient last refilled Remeron on 1/8 for a 90 day supply and therefore it's refill too soon. They also are unable to fill Ritalin as they do no have any in stock. Writer will reach out to  pharmacy to confirm their availability for Ritalin.     Reached out to [facility] pharmacy and spoke with pharmacist, who confirmed they have Ritalin 20 mg in stock.     Reached out to patient at () to confirm preferred pharmacy to obtain Ritalin refill as well as update regarding Remeron fill. No answer at number provided. LVM, requesting a call back. Clinic number provided.

## 2024-02-23 NOTE — TELEPHONE ENCOUNTER
Second attempt made to reach out to patient to connect regarding refills. Patient confirmed having enough Remeron, Paxil, Klonopin at this time. She will get Trazodone filled at the pharmacy. Patient also requested Ritalin rx be sent to [facility] ([phone]). Meds pended and routed to provider to review and sign off.

## 2024-04-04 DIAGNOSIS — F90.0 ATTENTION DEFICIT HYPERACTIVITY DISORDER (ADHD), PREDOMINANTLY INATTENTIVE TYPE: ICD-10-CM

## 2024-04-04 NOTE — TELEPHONE ENCOUNTER
Last Seen: 1-2-2024   RTC: 4-8 weeks   Cancel: 4-   No-Show: none   Next Appt:  none     Incoming Refill From [facility]  via  fax     Medication Requested: methylphenidate (RITALIN) 20 MG tablet   Directions: Sig: Take 2 tabs in am and one tab at noon.   Qty: 90   Last Refill:      Medication Refill pended Per Refill Protocol             Anisa Rodarte on 4/4/2024 at 5:15 PM

## 2024-04-05 RX ORDER — METHYLPHENIDATE HYDROCHLORIDE 20 MG/1
TABLET ORAL
Qty: 90 TABLET | Refills: 0 | Status: SHIPPED | OUTPATIENT
Start: 2024-04-05 | End: 2024-05-07

## 2024-04-05 NOTE — CONFIDENTIAL NOTE
Rx Refill note     I received rx refill request for methylphendiate 20mg, #90, sig 2 tabs po qam and 1 tab po qnoon - as I am a psychiatric prescriber of the day for the clinic today.     Reviewed portions of Rossi TAYLOR Yannkayleemo's medical record, including most recent psychiatric progress note by Dr. Ricks of 1/2/2024. Reviewed notes in this encounter and appreciated [place]  data. Follow-up with Amy Ricks MD, is scheduled for 4/16/2024.     Will order methylphendiate 20mg, #90, sig 2 tabs po qam and 1 tab po qnoon, R-0.     Armando Bob MD

## 2024-04-19 DIAGNOSIS — F41.1 GENERALIZED ANXIETY DISORDER: ICD-10-CM

## 2024-04-19 NOTE — TELEPHONE ENCOUNTER
Writer spoke to and scheduled patient on 5/7/24. Patient was not sure if the pharmacy would reach out to her when the medication was filled, or if the nurse could notify her when this is done. Patient stated it is a small pharmacy and she was not sure how they worked. Patient stated she would like to start taking Pramipexole 25mg 1-2 at bedtime again. Patient stated she initially thought this medication was causing some side effects and so she stopped taking the medication, but the side effects did not go away. Patient stated the medication is not causing the side effects and would like to go back on the [place] [place] [facility] [address]

## 2024-04-19 NOTE — TELEPHONE ENCOUNTER
Writer called the pharmacy and spoke with the pharmacist. Confirmed she needs refills of her paroxetine 20 mg and 40 mg. The other two requested medications are prescribed by another provider.    Last seen: 1/2/24  RTC: 4-8 weeks  Cancel: none  No-show: none  Next appt: 4/16/24     Incoming refill from Patient and pharmacy via phone    Medication requested:   Pending Prescriptions:                       Disp   Refills    PARoxetine (PAXIL) 20 MG tablet           90 tab*0            Sig: Take 1 tablet (20 mg) by mouth every morning IN           ADDITION TO 40 MG FOR A TOTAL OF 60 MG IN THE           MORNING    PARoxetine (PAXIL) 40 MG tablet           90 tab*0            Sig: Take 1 tablet (40 mg) by mouth every morning IN           ADDITION TO 20 MG FOR A TOTAL OF 60 MG IN THE           MORNING    From chart note:   --ContinuePaxil to 60 mg/day      Medication unable to be refilled by RN due to criteria not met as indicated.                 []Eligibility - not seen in the last year              []Supervision - no future appointment              [x]Compliance - no shows, cancellations or lapse in therapy              []Verification - order discrepancy              []Controlled medication              []Medication not included in policy              []90-day supply request              []Other:        Jil Tapia RN on 4/19/2024 at 11:06 AM

## 2024-04-19 NOTE — TELEPHONE ENCOUNTER
[facility] Call Center    Phone Message    May a detailed message be left on voicemail: yes     Reason for Call: Medication Refill Request    Has the patient contacted the pharmacy for the refill? Yes   Name of medication being requested: Pharmacy says they have not filled medication before for patient, writer unable to confirm on chart  Provider who prescribed the medication: Dr. Ricks  Pharmacy: [place] [address]   Date medication is needed:     Pharmacy says they were unable to fax refill request to clinic, writer confirmed fax # correct--pharmacy states they receive an error when attempting to fax.    Action Taken: Message routed to:  Other: P SPYCHIATRY NURSE-[facility]    Travel Screening: Not Applicable

## 2024-04-23 RX ORDER — PAROXETINE 40 MG/1
40 TABLET, FILM COATED ORAL EVERY MORNING
Qty: 30 TABLET | Refills: 0 | Status: SHIPPED | OUTPATIENT
Start: 2024-04-23 | End: 2024-05-07

## 2024-04-23 RX ORDER — PAROXETINE 20 MG/1
20 TABLET, FILM COATED ORAL EVERY MORNING
Qty: 30 TABLET | Refills: 0 | Status: SHIPPED | OUTPATIENT
Start: 2024-04-23 | End: 2024-05-07

## 2024-04-24 NOTE — TELEPHONE ENCOUNTER
Reached out to patient to update that provider would like to discuss medications further at the next appt on 5/7. No answer at number provided. Unable to LVM due to no option.

## 2024-04-25 ENCOUNTER — MYC MEDICAL ADVICE (OUTPATIENT)
Dept: PSYCHIATRY | Facility: CLINIC | Age: 59
End: 2024-04-25
Payer: COMMERCIAL

## 2024-04-25 NOTE — TELEPHONE ENCOUNTER
Second attempt made to reach out to patient. No answer at number provided. Unable to LVM due to mailbox not set up.

## 2024-04-29 RX ORDER — PRAMIPEXOLE DIHYDROCHLORIDE 0.25 MG/1
TABLET ORAL
Qty: 16 TABLET | Refills: 0 | Status: SHIPPED | OUTPATIENT
Start: 2024-04-29 | End: 2024-07-18

## 2024-04-29 NOTE — TELEPHONE ENCOUNTER
Amy Ricks MD  You15 hours ago (4:41 PM)     SS  I am ok with her restarting pramipexole 0.25 m-2 at bedtime prn before she sees me in May.  Can you do rx?  ss   - Meds refilled per providers approval   - Med tab changed to reflect this

## 2024-04-29 NOTE — TELEPHONE ENCOUNTER
Reached out to patient to update her regarding Pramipexole rx. No answer at number provided. Unable to LVM due to no option.

## 2024-05-06 DIAGNOSIS — F41.1 GAD (GENERALIZED ANXIETY DISORDER): ICD-10-CM

## 2024-05-06 DIAGNOSIS — F98.8 ATTENTION DEFICIT DISORDER PREDOMINANT INATTENTIVE TYPE: Primary | ICD-10-CM

## 2024-05-06 NOTE — TELEPHONE ENCOUNTER
"Date of Last Office Visit: 1/2/2024  [facility] Mental Health & Addiction [facility]     Amy Ricks MD     RTC: 4-8 weeks.   No shows: x1 - 4/16  Cancellations: 0  Date of Next Office Visit:   5/7/2024 Status: Garrett    Arrive By: 3:15 PM     Appointment Time: 3:30 PM Length: 30   Visit Type: ADULT PSYCHIATRY RETURN [[id number]] DHEERAJ: [id number]   Provider: Amy Ricks MD Department: [facility] PSYCHIATRY     ------------------------------    Incoming refill from Pharmacy via interface  Medication requested:    methylphenidate (RITALIN) 20 MG tablet 90 tablet 0 4/5/2024 -- No   Sig: Take 2 tabs in am and one tab at noon.     ------------------------------  From last visit note:   \"--Continue methyphenidate 20 mg at 6 am, one at 8am in am and additional 20 mg with last dose in the early afternoon. -use this form instead of concerta due to cost \"         Refill decision: Refill pended and routed to the provider for review/determination due to the following criteria not met: controlled med. No future appt and No Show x1  Scheduling has been notified to contact the pt for appointment.      Medication unable to be refilled by RN due to criteria not met as indicated.                 []Eligibility - not seen in the last year              [x]Supervision - no future appointment              [x]Compliance - no shows, cancellations or lapse in therapy              []Verification - order discrepancy              [x]Controlled medication              []Medication not included in policy              []90-day supply request              []Other:                        "

## 2024-05-07 ENCOUNTER — VIRTUAL VISIT (OUTPATIENT)
Dept: PSYCHIATRY | Facility: CLINIC | Age: 59
End: 2024-05-07
Attending: PSYCHIATRY & NEUROLOGY
Payer: COMMERCIAL

## 2024-05-07 DIAGNOSIS — F90.0 ATTENTION DEFICIT HYPERACTIVITY DISORDER (ADHD), PREDOMINANTLY INATTENTIVE TYPE: ICD-10-CM

## 2024-05-07 DIAGNOSIS — F41.1 GAD (GENERALIZED ANXIETY DISORDER): ICD-10-CM

## 2024-05-07 DIAGNOSIS — F41.1 GENERALIZED ANXIETY DISORDER: ICD-10-CM

## 2024-05-07 PROCEDURE — 99214 OFFICE O/P EST MOD 30 MIN: CPT | Mod: 93 | Performed by: PSYCHIATRY & NEUROLOGY

## 2024-05-07 PROCEDURE — 90833 PSYTX W PT W E/M 30 MIN: CPT | Mod: 93 | Performed by: PSYCHIATRY & NEUROLOGY

## 2024-05-07 RX ORDER — METHYLPHENIDATE HYDROCHLORIDE 20 MG/1
TABLET ORAL
Qty: 90 TABLET | Refills: 0 | Status: SHIPPED | OUTPATIENT
Start: 2024-05-07 | End: 2024-05-07

## 2024-05-07 RX ORDER — METHYLPHENIDATE HYDROCHLORIDE 20 MG/1
TABLET ORAL
Qty: 90 TABLET | Refills: 0 | Status: SHIPPED | OUTPATIENT
Start: 2024-05-07

## 2024-05-07 RX ORDER — MIRTAZAPINE 45 MG/1
45 TABLET, FILM COATED ORAL AT BEDTIME
Qty: 90 TABLET | Refills: 1 | Status: SHIPPED | OUTPATIENT
Start: 2024-05-07

## 2024-05-07 RX ORDER — MIRTAZAPINE 45 MG/1
45 TABLET, FILM COATED ORAL AT BEDTIME
Qty: 90 TABLET | Refills: 1 | OUTPATIENT
Start: 2024-05-07

## 2024-05-07 RX ORDER — PAROXETINE 40 MG/1
40 TABLET, FILM COATED ORAL EVERY MORNING
Qty: 30 TABLET | Refills: 3 | Status: SHIPPED | OUTPATIENT
Start: 2024-05-07 | End: 2024-09-03

## 2024-05-07 RX ORDER — BUSPIRONE HYDROCHLORIDE 10 MG/1
TABLET ORAL
Qty: 630 TABLET | Refills: 1 | Status: SHIPPED | OUTPATIENT
Start: 2024-05-07 | End: 2024-09-03

## 2024-05-07 RX ORDER — PAROXETINE 20 MG/1
20 TABLET, FILM COATED ORAL EVERY MORNING
Qty: 30 TABLET | Refills: 3 | Status: SHIPPED | OUTPATIENT
Start: 2024-05-07 | End: 2024-09-03

## 2024-05-07 RX ORDER — CLONAZEPAM 0.5 MG/1
0.5 TABLET ORAL
Qty: 90 TABLET | Refills: 2 | Status: SHIPPED | OUTPATIENT
Start: 2024-05-07

## 2024-05-07 RX ORDER — METHYLPHENIDATE HYDROCHLORIDE 20 MG/1
TABLET ORAL
Qty: 90 TABLET | Refills: 0 | Status: SHIPPED | OUTPATIENT
Start: 2024-05-07 | End: 2024-09-03

## 2024-05-07 RX ORDER — TRAZODONE HYDROCHLORIDE 100 MG/1
100 TABLET ORAL AT BEDTIME
Qty: 90 TABLET | Refills: 2 | Status: SHIPPED | OUTPATIENT
Start: 2024-05-07 | End: 2024-09-03

## 2024-05-07 ASSESSMENT — PAIN SCALES - GENERAL: PAINLEVEL: NO PAIN (0)

## 2024-05-07 ASSESSMENT — PATIENT HEALTH QUESTIONNAIRE - PHQ9: SUM OF ALL RESPONSES TO PHQ QUESTIONS 1-9: 17

## 2024-05-07 NOTE — TELEPHONE ENCOUNTER
mirtazapine (REMERON) 45 MG tablet 90 tablet 1 1/2/2024 -- No   Sig - Route: Take 1 tablet (45 mg) by mouth at bedtime - Oral   [id number] - [place], [place] - [address] AT [facility] [address]

## 2024-05-07 NOTE — NURSING NOTE
Is the patient currently in the state of [place]? YES    Visit mode:TELEPHONE    If the visit is dropped, the patient can be reconnected by: TELEPHONE VISIT: Phone number: [phone]    Will anyone else be joining the visit? NO  (If patient encounters technical issues they should call [phone]    How would you like to obtain your AVS? MyChart    Are changes needed to the allergy or medication list? No    Are refills needed on medications prescribed by this physician? NO    Reason for visit: RECHECK    Luiz LUCIANO

## 2024-05-07 NOTE — PATIENT INSTRUCTIONS
**For crisis resources, please see the information at the end of this document**   Patient Education    Thank you for coming to the [facility] & [facility] [place] [facility].     Lab Testing:  If you had lab testing today and your results are reassuring or normal they will be mailed to you or sent through [employer] within 7 days. If the lab tests need quick action we will call you with the results. The phone number we will call with results is # [phone]. If this is not the best number please call our clinic and change the number.     Medication Refills:  If you need any refills please call your pharmacy and they will contact us. Our fax number for refills is [phone].   Three business days of notice are needed for general medication refill requests.   Five business days of notice are needed for controlled substance refill requests.   If you need to change to a different pharmacy, please contact the new pharmacy directly. The new pharmacy will help you get your medications transferred.     Contact Us:  Please call [phone] during business hours (8-5:00 M-F).   If you have medication related questions after clinic hours, or on the weekend, please call [phone].     Financial Assistance [phone]   Medical Records [phone]       MENTAL HEALTH CRISIS RESOURCES:  For a emergency help, please call 911 or go to the nearest Emergency Department.     Emergency Walk-In Options:   EmPATH Unit @ [facility] ([place]): [phone] - Specialized mental health emergency area designed to be calming  [facility] ([place]): [phone]  [facility] ([place]): [phone]  [facility]): [phone]    [facility] Crisis Information:   [place]: [phone]  [place]: [phone]  [place] ([place]) - Adult: [phone]     Child: [phone]  [place] - Adult: [phone]     Child: [phone]  [place]:  [phone]  List of all [facility] resources:   https://[place].gov/dhs/people-we-serve/adults/health-care/mental-health/resources/crisis-contacts.jsp    National Crisis Information:   Crisis Text Line: Text  [place]  to 248864  Suicide & Crisis Lifeline: 988  National Suicide Prevention Lifeline: 7-913-928-TALK (1-325.626.8226)       For online chat options, visit https://suicidepreventionlifeline.org/chat/  Poison Control Center: 9-339-410-5001  Trans Lifeline: 7-386-212-2558 - Hotline for transgender people of all ages  [employer]: 2-223-978-4093 - Hotline for LGBT youth     For Non-Emergency Support:   Fast Tracker: Mental Health & Substance Use Disorder Resources -   https://www.[employer].org/
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
## 2024-05-24 RX ORDER — METHYLPHENIDATE HYDROCHLORIDE 20 MG/1
TABLET ORAL
Qty: 90 TABLET | Refills: 0 | Status: SHIPPED | OUTPATIENT
Start: 2024-05-24 | End: 2024-09-03

## 2024-07-17 DIAGNOSIS — F41.1 GENERALIZED ANXIETY DISORDER: ICD-10-CM

## 2024-07-18 RX ORDER — PRAMIPEXOLE DIHYDROCHLORIDE 0.25 MG/1
TABLET ORAL
Qty: 60 TABLET | Refills: 1 | Status: SHIPPED | OUTPATIENT
Start: 2024-07-18

## 2024-07-18 NOTE — TELEPHONE ENCOUNTER
"Date of Last Office Visit: 5/7/2024  [facility] Mental Health & Addiction [facility]  RTC: 4 mo  No shows: 0  Cancellations: 0  Date of Next Office Visit:    9/3/2024 1:30 PM (30 min)  [place]   Arrive by:  1:15 PM   ADULT PSYCHIATRY RETURN    Rfl Status: Authorized   URPSY ([facility])   Amy Ricks MD     ------------------------------    Incoming refill from Pharmacy via interface  Medication requested:    pramipexole (MIRAPEX) 0.25 MG tablet 16 tablet 0 4/29/2024 -- No   Sig: Take 1-2 tabs by mouth daily as needed     ------------------------------  From last visit note:   \"-Mirapex dc'ed. Requip now \"       Refill decision: Refill pended and routed to the provider for review/determination due to the following criteria not met: Last visit note indicates Mirapex dc'd, requip  instead  - is this accurate?    Medication unable to be refilled by RN due to criteria not met as indicated.                 []Eligibility - not seen in the last year              []Supervision - no future appointment              []Compliance - no shows, cancellations or lapse in therapy              [x]Verification - order discrepancy              []Controlled medication              []Medication not included in policy              []90-day supply request              []Other:                        "

## 2024-08-02 DIAGNOSIS — F90.0 ATTENTION DEFICIT HYPERACTIVITY DISORDER (ADHD), PREDOMINANTLY INATTENTIVE TYPE: Primary | ICD-10-CM

## 2024-08-02 NOTE — TELEPHONE ENCOUNTER
methylphenidate (RITALIN) 20 MG tablet -TAKE 2 TABLETS (40MG) BY MOUTH EVERY MORNING AND 1 TABLET AT NOON   Last refilled: 5/24/24  Qty: 90      Last seen: 5/7/24   RTC: 4 months  Cancel: 0  No-show: 0  Next appt: 9/3/24    Refill decision:   Refill pended and routed to the provider for review/determination due to controlled substance

## 2024-08-05 RX ORDER — METHYLPHENIDATE HYDROCHLORIDE 20 MG/1
TABLET ORAL
Qty: 90 TABLET | Refills: 0 | Status: SHIPPED | OUTPATIENT
Start: 2024-08-05

## 2024-08-13 DIAGNOSIS — F41.1 GENERALIZED ANXIETY DISORDER: ICD-10-CM

## 2024-08-14 DIAGNOSIS — F41.1 GENERALIZED ANXIETY DISORDER: ICD-10-CM

## 2024-08-14 RX ORDER — PAROXETINE 40 MG/1
TABLET, FILM COATED ORAL
Qty: 90 TABLET | OUTPATIENT
Start: 2024-08-14

## 2024-08-14 RX ORDER — PAROXETINE 20 MG/1
TABLET, FILM COATED ORAL
Qty: 90 TABLET | OUTPATIENT
Start: 2024-08-14

## 2024-08-24 ENCOUNTER — HEALTH MAINTENANCE LETTER (OUTPATIENT)
Age: 59
End: 2024-08-24

## 2024-09-03 ENCOUNTER — VIRTUAL VISIT (OUTPATIENT)
Dept: PSYCHIATRY | Facility: CLINIC | Age: 59
End: 2024-09-03
Attending: PSYCHIATRY & NEUROLOGY
Payer: COMMERCIAL

## 2024-09-03 DIAGNOSIS — F98.8 ATTENTION DEFICIT DISORDER PREDOMINANT INATTENTIVE TYPE: ICD-10-CM

## 2024-09-03 DIAGNOSIS — F41.1 GAD (GENERALIZED ANXIETY DISORDER): ICD-10-CM

## 2024-09-03 DIAGNOSIS — F41.1 GENERALIZED ANXIETY DISORDER: ICD-10-CM

## 2024-09-03 DIAGNOSIS — F90.0 ATTENTION DEFICIT HYPERACTIVITY DISORDER (ADHD), PREDOMINANTLY INATTENTIVE TYPE: ICD-10-CM

## 2024-09-03 PROCEDURE — 90833 PSYTX W PT W E/M 30 MIN: CPT | Mod: FQ | Performed by: PSYCHIATRY & NEUROLOGY

## 2024-09-03 PROCEDURE — 99214 OFFICE O/P EST MOD 30 MIN: CPT | Mod: FQ | Performed by: PSYCHIATRY & NEUROLOGY

## 2024-09-03 RX ORDER — PAROXETINE 20 MG/1
20 TABLET, FILM COATED ORAL EVERY MORNING
Qty: 30 TABLET | Refills: 3 | Status: SHIPPED | OUTPATIENT
Start: 2024-09-03

## 2024-09-03 RX ORDER — PAROXETINE 40 MG/1
40 TABLET, FILM COATED ORAL EVERY MORNING
Qty: 30 TABLET | Refills: 3 | Status: SHIPPED | OUTPATIENT
Start: 2024-09-03

## 2024-09-03 RX ORDER — METHYLPHENIDATE HYDROCHLORIDE 20 MG/1
TABLET ORAL
Qty: 90 TABLET | Refills: 0 | Status: SHIPPED | OUTPATIENT
Start: 2024-09-03

## 2024-09-03 RX ORDER — BUSPIRONE HYDROCHLORIDE 10 MG/1
TABLET ORAL
Qty: 630 TABLET | Refills: 1 | Status: SHIPPED | OUTPATIENT
Start: 2024-09-03

## 2024-09-03 RX ORDER — TRAZODONE HYDROCHLORIDE 100 MG/1
100 TABLET ORAL AT BEDTIME
Qty: 90 TABLET | Refills: 2 | Status: SHIPPED | OUTPATIENT
Start: 2024-09-03

## 2024-09-03 ASSESSMENT — PAIN SCALES - GENERAL: PAINLEVEL: SEVERE PAIN (7)
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
## 2024-09-03 NOTE — NURSING NOTE
Current patient location: [id number] [place] [id number]    Is the patient currently in the state of [place]? YES    Visit mode:TELEPHONE    If the visit is dropped, the patient can be reconnected by:  cell phone:   Telephone Information:   Mobile [phone]       Will anyone else be joining the visit? NO  (If patient encounters technical issues they should call [phone]    How would you like to obtain your AVS? MyChart    Are changes needed to the allergy or medication list? No    Are refills needed on medications prescribed by this physician? YES    Rooming Documentation:  Patient will complete questionnaire(s) in MyChart      Reason for visit: RECHPETE STOCKF

## 2024-09-03 NOTE — PATIENT INSTRUCTIONS
**For crisis resources, please see the information at the end of this document**   Patient Education    Thank you for coming to the [facility] [place] [facility].     Lab Testing:  If you had lab testing today and your results are reassuring or normal they will be mailed to you or sent through [employer] within 7 days. If the lab tests need quick action we will call you with the results. The phone number we will call with results is # [phone]. If this is not the best number please call our clinic and change the number.     Medication Refills:  If you need any refills please call your pharmacy and they will contact us. Our fax number for refills is [phone].   Three business days of notice are needed for general medication refill requests.   Five business days of notice are needed for controlled substance refill requests.   If you need to change to a different pharmacy, please contact the new pharmacy directly. The new pharmacy will help you get your medications transferred.     Contact Us:  Please call [phone] during business hours (8-5:00 M-F).   If you have medication related questions after clinic hours, or on the weekend, please call [phone].     Financial Assistance [phone]   Medical Records [phone]       MENTAL HEALTH CRISIS RESOURCES:  For a emergency help, please call 911 or go to the nearest Emergency Department.     Emergency Walk-In Options:   EmPATH Unit @ [facility] ([place]): [phone] - Specialized mental health emergency area designed to be calming  [facility] ([place]): [phone]  [facility] Psychiat[facility] ([place]): [phone]  [facility]): [phone]    [facility] Crisis Information:   [place]: [phone]  [place]: [phone]  [place] (TOMMY) - Adult: [phone]     Child: [phone]  [place] - Adult: [phone]     Child: [phone]  [place]:  [phone]  List of all [facility] resources:   https://[place].gov/dhs/people-we-serve/adults/health-care/mental-health/resources/crisis-contacts.jsp    National Crisis Information:   Crisis Text Line: Text  [place]  to 848560  Suicide & Crisis Lifeline: 988  National Suicide Prevention Lifeline: 4-831-834-TALK (1-426.368.2719)       For online chat options, visit https://suicidepreventionlifeline.org/chat/  Poison Control Center: 6-752-962-9589  Trans Lifeline: 9-033-863-7039 - Hotline for transgender people of all ages  [employer]: 0-812-486-6222 - Hotline for LGBT youth     For Non-Emergency Support:   Fast Tracker: Mental Health & Substance Use Disorder Resources -   https://www.[place].org/

## 2024-09-10 ENCOUNTER — HOSPITAL ENCOUNTER (EMERGENCY)
Facility: CLINIC | Age: 59
Discharge: LEFT BEFORE DISCHARGE | End: 2024-09-10
Admitting: STUDENT IN AN ORGANIZED HEALTH CARE EDUCATION/TRAINING PROGRAM
Payer: COMMERCIAL

## 2024-09-10 VITALS
TEMPERATURE: 98.3 F | HEIGHT: 65 IN | WEIGHT: 121 LBS | RESPIRATION RATE: 14 BRPM | DIASTOLIC BLOOD PRESSURE: 77 MMHG | BODY MASS INDEX: 20.16 KG/M2 | OXYGEN SATURATION: 98 % | SYSTOLIC BLOOD PRESSURE: 144 MMHG | HEART RATE: 56 BPM

## 2024-09-10 PROCEDURE — 99281 EMR DPT VST MAYX REQ PHY/QHP: CPT

## 2024-09-10 ASSESSMENT — COLUMBIA-SUICIDE SEVERITY RATING SCALE - C-SSRS
6. HAVE YOU EVER DONE ANYTHING, STARTED TO DO ANYTHING, OR PREPARED TO DO ANYTHING TO END YOUR LIFE?: NO
1. IN THE PAST MONTH, HAVE YOU WISHED YOU WERE DEAD OR WISHED YOU COULD GO TO SLEEP AND NOT WAKE UP?: NO
2. HAVE YOU ACTUALLY HAD ANY THOUGHTS OF KILLING YOURSELF IN THE PAST MONTH?: NO

## 2024-09-11 NOTE — ED TRIAGE NOTES
Pt was out walking her dog and got attacked by two other dogs that were loose. Pt has puncture wounds to left elbow area and has some swelling there and c/o a lot of pain to area, and bites to both legs. Unsure immunization status of other dogs. Per chart last tetanus was 2013.

## 2024-09-11 NOTE — ED TRIAGE NOTES
Triage Assessment (Adult)       Row Name 09/10/24 1928          Triage Assessment    Airway WDL WDL        Respiratory WDL    Respiratory WDL WDL        Peripheral/Neurovascular WDL    Peripheral Neurovascular WDL WDL

## 2024-11-11 DIAGNOSIS — F41.1 GAD (GENERALIZED ANXIETY DISORDER): ICD-10-CM

## 2024-11-11 DIAGNOSIS — F98.8 ATTENTION DEFICIT DISORDER PREDOMINANT INATTENTIVE TYPE: Primary | ICD-10-CM

## 2024-11-11 NOTE — TELEPHONE ENCOUNTER
Medication requested: Methylphenidate HCl 20 MG Oral Tablet (Ritalin)   Last refilled: 10-10-24  Qty: 90  Multiple Rx's on med list, 9-3-24 for 90t /w 0rf; different -earliest fill date    Medication requested: clonazePAM 0.5 MG Oral Tablet (KlonoPIN)   Last refilled: 7-27-24  Qty: 90      Last seen: 9-3-24  RTC: one month  Cancel: 0  No-show: 0  Next appt: 11-12-24    Refill decision:   Controlled meds  Appt 11-12-24

## 2024-11-12 RX ORDER — METHYLPHENIDATE HYDROCHLORIDE 20 MG/1
TABLET ORAL
Qty: 90 TABLET | Refills: 0 | Status: SHIPPED | OUTPATIENT
Start: 2024-11-12

## 2024-11-12 RX ORDER — CLONAZEPAM 0.5 MG/1
0.5 TABLET ORAL
Qty: 90 TABLET | Refills: 0 | Status: SHIPPED | OUTPATIENT
Start: 2024-11-12

## 2024-11-19 ENCOUNTER — VIRTUAL VISIT (OUTPATIENT)
Dept: PSYCHIATRY | Facility: CLINIC | Age: 59
End: 2024-11-19
Attending: PSYCHIATRY & NEUROLOGY
Payer: COMMERCIAL

## 2024-11-19 DIAGNOSIS — F41.1 GAD (GENERALIZED ANXIETY DISORDER): ICD-10-CM

## 2024-11-19 DIAGNOSIS — F98.8 ATTENTION DEFICIT DISORDER PREDOMINANT INATTENTIVE TYPE: ICD-10-CM

## 2024-11-19 DIAGNOSIS — F90.0 ATTENTION DEFICIT HYPERACTIVITY DISORDER (ADHD), PREDOMINANTLY INATTENTIVE TYPE: ICD-10-CM

## 2024-11-19 DIAGNOSIS — F41.1 GENERALIZED ANXIETY DISORDER: ICD-10-CM

## 2024-11-19 RX ORDER — PAROXETINE 40 MG/1
40 TABLET, FILM COATED ORAL EVERY MORNING
Qty: 90 TABLET | Refills: 1 | Status: SHIPPED | OUTPATIENT
Start: 2024-11-19

## 2024-11-19 RX ORDER — METHYLPHENIDATE HYDROCHLORIDE 20 MG/1
TABLET ORAL
Qty: 90 TABLET | Refills: 0 | Status: SHIPPED | OUTPATIENT
Start: 2024-11-19

## 2024-11-19 RX ORDER — PAROXETINE 20 MG/1
20 TABLET, FILM COATED ORAL EVERY MORNING
Qty: 90 TABLET | Refills: 1 | Status: SHIPPED | OUTPATIENT
Start: 2024-11-19

## 2024-11-19 RX ORDER — BUSPIRONE HYDROCHLORIDE 10 MG/1
TABLET ORAL
Qty: 630 TABLET | Refills: 1 | Status: SHIPPED | OUTPATIENT
Start: 2024-11-19

## 2024-11-19 RX ORDER — TRAZODONE HYDROCHLORIDE 100 MG/1
100 TABLET ORAL AT BEDTIME
Qty: 90 TABLET | Refills: 1 | Status: SHIPPED | OUTPATIENT
Start: 2024-11-19

## 2024-11-19 RX ORDER — MIRTAZAPINE 45 MG/1
45 TABLET, FILM COATED ORAL AT BEDTIME
Qty: 90 TABLET | Refills: 1 | Status: SHIPPED | OUTPATIENT
Start: 2024-11-19

## 2024-11-19 RX ORDER — METHYLPHENIDATE HYDROCHLORIDE 20 MG/1
TABLET ORAL
Qty: 90 TABLET | Refills: 0 | Status: SHIPPED | OUTPATIENT
Start: 2024-11-19 | End: 2024-11-19

## 2024-11-19 NOTE — NURSING NOTE
Current patient location: [id number] [place] [phone]    Is the patient currently in the state of [place]? YES    Visit mode:TELEPHONE    If the visit is dropped, the patient can be reconnected by:TELEPHONE VISIT: Phone number:   Telephone Information:   Mobile [phone]       Will anyone else be joining the visit? NO  (If patient encounters technical issues they should call [phone])    Are changes needed to the allergy or medication list? No    Are refills needed on medications prescribed by this physician? Patient not sure if refills are needed today    Rooming Documentation:  Unable to complete questionnaire(s) due to time    Reason for visit: RECHECK    Carrie STOCKF

## 2024-11-19 NOTE — PATIENT INSTRUCTIONS
**For crisis resources, please see the information at the end of this document**   Patient Education    Thank you for coming to the [facility] & [facility] [place] [facility].     Lab Testing:  If you had lab testing today and your results are reassuring or normal they will be mailed to you or sent through [employer] within 7 days. If the lab tests need quick action we will call you with the results. The phone number we will call with results is # [phone]. If this is not the best number please call our clinic and change the number.     Medication Refills:  If you need any refills please call your pharmacy and they will contact us. Our fax number for refills is [phone].   Three business days of notice are needed for general medication refill requests.   Five business days of notice are needed for controlled substance refill requests.   If you need to change to a different pharmacy, please contact the new pharmacy directly. The new pharmacy will help you get your medications transferred.     Contact Us:  Please call [phone] during business hours (8-5:00 M-F).   If you have medication related questions after clinic hours, or on the weekend, please call [phone].     Financial Assistance [phone]   Medical Records [phone]       MENTAL HEALTH CRISIS RESOURCES:  For a emergency help, please call 911 or go to the nearest Emergency Department.     Emergency Walk-In Options:   EmPATH Unit @ [facility] ([place]): [phone] - Specialized mental health emergency area designed to be calming  [facility] ([place]): [phone]  [facility] ([place]): [phone]  [facility]): [phone]    [facility] Crisis Information:   [place]: [phone]  [place]: [phone]  [place] (TOMMY) - Adult: [phone]     Child: [phone]  [place] - Adult: [phone]     Child: [phone]  [place]:  [phone]  List of all [facility] resources:   https://[place].gov/dhs/people-we-serve/adults/health-care/mental-health/resources/crisis-contacts.jsp    National Crisis Information:   Crisis Text Line: Text  [place]  to 215585  Suicide & Crisis Lifeline: 988  National Suicide Prevention Lifeline: 1-538-609-TALK (1-207.249.6228)       For online chat options, visit https://suicidepreventionlifeline.org/chat/  Poison Control Center: 0-416-180-6032  Trans Lifeline: 0-696-971-8390 - Hotline for transgender people of all ages  [employer]: 5-081-999-1570 - Hotline for LGBT youth     For Non-Emergency Support:   Fast Tracker: Mental Health & Substance Use Disorder Resources -   https://www.[place].org/
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
## 2024-12-10 ENCOUNTER — OFFICE VISIT (OUTPATIENT)
Dept: PSYCHIATRY | Facility: CLINIC | Age: 59
End: 2024-12-10
Attending: PSYCHIATRY & NEUROLOGY
Payer: COMMERCIAL

## 2024-12-10 DIAGNOSIS — F41.1 GAD (GENERALIZED ANXIETY DISORDER): Primary | ICD-10-CM

## 2024-12-10 PROCEDURE — G0463 HOSPITAL OUTPT CLINIC VISIT: HCPCS | Performed by: PSYCHIATRY & NEUROLOGY

## 2024-12-10 ASSESSMENT — PATIENT HEALTH QUESTIONNAIRE - PHQ9
SUM OF ALL RESPONSES TO PHQ QUESTIONS 1-9: 8
SUM OF ALL RESPONSES TO PHQ QUESTIONS 1-9: 8
10. IF YOU CHECKED OFF ANY PROBLEMS, HOW DIFFICULT HAVE THESE PROBLEMS MADE IT FOR YOU TO DO YOUR WORK, TAKE CARE OF THINGS AT HOME, OR GET ALONG WITH OTHER PEOPLE: SOMEWHAT DIFFICULT

## 2024-12-10 NOTE — NURSING NOTE
Chief Complaint   Patient presents with    Recheck Medication     BEE (generalized anxiety disorder     Pt declined to be roomed.    - Rich Seymour, Visit Facilitator
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
## 2025-04-30 DIAGNOSIS — F41.1 GAD (GENERALIZED ANXIETY DISORDER): ICD-10-CM

## 2025-04-30 RX ORDER — CLONAZEPAM 0.5 MG/1
0.5 TABLET ORAL
Qty: 90 TABLET | OUTPATIENT
Start: 2025-04-30